# Patient Record
Sex: FEMALE | Race: BLACK OR AFRICAN AMERICAN | Employment: OTHER | URBAN - METROPOLITAN AREA
[De-identification: names, ages, dates, MRNs, and addresses within clinical notes are randomized per-mention and may not be internally consistent; named-entity substitution may affect disease eponyms.]

---

## 2017-01-25 ENCOUNTER — HOSPITAL ENCOUNTER (OUTPATIENT)
Age: 62
Setting detail: OBSERVATION
Discharge: HOME | End: 2017-01-26
Attending: EMERGENCY MEDICINE | Admitting: INTERNAL MEDICINE
Payer: COMMERCIAL

## 2017-01-25 ENCOUNTER — APPOINTMENT (OUTPATIENT)
Dept: GENERAL RADIOLOGY | Age: 62
End: 2017-01-25
Attending: EMERGENCY MEDICINE
Payer: COMMERCIAL

## 2017-01-25 DIAGNOSIS — R77.8 ELEVATED TROPONIN: ICD-10-CM

## 2017-01-25 DIAGNOSIS — R06.02 SOB (SHORTNESS OF BREATH): ICD-10-CM

## 2017-01-25 DIAGNOSIS — R07.9 CHEST PAIN, UNSPECIFIED TYPE: Primary | ICD-10-CM

## 2017-01-25 DIAGNOSIS — R79.89 ELEVATED BRAIN NATRIURETIC PEPTIDE (BNP) LEVEL: ICD-10-CM

## 2017-01-25 PROBLEM — R06.00 DYSPNEA: Status: ACTIVE | Noted: 2017-01-25

## 2017-01-25 PROBLEM — R05.9 COUGH: Status: ACTIVE | Noted: 2017-01-25

## 2017-01-25 PROBLEM — I50.23 SYSTOLIC CHF, ACUTE ON CHRONIC (HCC): Status: ACTIVE | Noted: 2017-01-25

## 2017-01-25 PROBLEM — R11.10 VOMITING: Status: ACTIVE | Noted: 2017-01-25

## 2017-01-25 LAB
ALBUMIN SERPL BCP-MCNC: 3.3 G/DL (ref 3.5–5)
ALBUMIN/GLOB SERPL: 0.9 {RATIO} (ref 1.1–2.2)
ALP SERPL-CCNC: 77 U/L (ref 45–117)
ALT SERPL-CCNC: 112 U/L (ref 12–78)
ANION GAP BLD CALC-SCNC: 9 MMOL/L (ref 5–15)
APPEARANCE UR: CLEAR
AST SERPL W P-5'-P-CCNC: 140 U/L (ref 15–37)
ATRIAL RATE: 94 BPM
BACTERIA URNS QL MICRO: NEGATIVE /HPF
BASOPHILS # BLD AUTO: 0 K/UL (ref 0–0.1)
BASOPHILS # BLD: 0 % (ref 0–1)
BILIRUB SERPL-MCNC: 0.3 MG/DL (ref 0.2–1)
BILIRUB UR QL: NEGATIVE
BNP SERPL-MCNC: 1106 PG/ML (ref 0–125)
BUN SERPL-MCNC: 19 MG/DL (ref 6–20)
BUN/CREAT SERPL: 21 (ref 12–20)
CALCIUM SERPL-MCNC: 8.7 MG/DL (ref 8.5–10.1)
CALCULATED P AXIS, ECG09: 60 DEGREES
CALCULATED R AXIS, ECG10: -6 DEGREES
CALCULATED T AXIS, ECG11: 132 DEGREES
CHLORIDE SERPL-SCNC: 103 MMOL/L (ref 97–108)
CO2 SERPL-SCNC: 30 MMOL/L (ref 21–32)
COLOR UR: ABNORMAL
CREAT SERPL-MCNC: 0.91 MG/DL (ref 0.55–1.02)
DIAGNOSIS, 93000: NORMAL
EOSINOPHIL # BLD: 0.1 K/UL (ref 0–0.4)
EOSINOPHIL NFR BLD: 1 % (ref 0–7)
EPITH CASTS URNS QL MICRO: ABNORMAL /LPF
ERYTHROCYTE [DISTWIDTH] IN BLOOD BY AUTOMATED COUNT: 14 % (ref 11.5–14.5)
EST. AVERAGE GLUCOSE BLD GHB EST-MCNC: 180 MG/DL
GLOBULIN SER CALC-MCNC: 3.7 G/DL (ref 2–4)
GLUCOSE BLD STRIP.AUTO-MCNC: 220 MG/DL (ref 65–100)
GLUCOSE BLD STRIP.AUTO-MCNC: 225 MG/DL (ref 65–100)
GLUCOSE BLD STRIP.AUTO-MCNC: 227 MG/DL (ref 65–100)
GLUCOSE BLD STRIP.AUTO-MCNC: 227 MG/DL (ref 65–100)
GLUCOSE SERPL-MCNC: 296 MG/DL (ref 65–100)
GLUCOSE UR STRIP.AUTO-MCNC: 250 MG/DL
HBA1C MFR BLD: 7.9 % (ref 4.2–6.3)
HCT VFR BLD AUTO: 38.6 % (ref 35–47)
HGB BLD-MCNC: 12.3 G/DL (ref 11.5–16)
HGB UR QL STRIP: NEGATIVE
HYALINE CASTS URNS QL MICRO: ABNORMAL /LPF (ref 0–5)
KETONES UR QL STRIP.AUTO: NEGATIVE MG/DL
LEUKOCYTE ESTERASE UR QL STRIP.AUTO: NEGATIVE
LYMPHOCYTES # BLD AUTO: 23 % (ref 12–49)
LYMPHOCYTES # BLD: 2.4 K/UL (ref 0.8–3.5)
MAGNESIUM SERPL-MCNC: 1.8 MG/DL (ref 1.6–2.4)
MCH RBC QN AUTO: 28 PG (ref 26–34)
MCHC RBC AUTO-ENTMCNC: 31.9 G/DL (ref 30–36.5)
MCV RBC AUTO: 87.9 FL (ref 80–99)
MONOCYTES # BLD: 0.5 K/UL (ref 0–1)
MONOCYTES NFR BLD AUTO: 4 % (ref 5–13)
NEUTS SEG # BLD: 7.5 K/UL (ref 1.8–8)
NEUTS SEG NFR BLD AUTO: 72 % (ref 32–75)
NITRITE UR QL STRIP.AUTO: NEGATIVE
P-R INTERVAL, ECG05: 162 MS
PH UR STRIP: 6.5 [PH] (ref 5–8)
PLATELET # BLD AUTO: 293 K/UL (ref 150–400)
POTASSIUM SERPL-SCNC: 4.2 MMOL/L (ref 3.5–5.1)
PROT SERPL-MCNC: 7 G/DL (ref 6.4–8.2)
PROT UR STRIP-MCNC: NEGATIVE MG/DL
Q-T INTERVAL, ECG07: 372 MS
QRS DURATION, ECG06: 114 MS
QTC CALCULATION (BEZET), ECG08: 465 MS
RBC # BLD AUTO: 4.39 M/UL (ref 3.8–5.2)
RBC #/AREA URNS HPF: ABNORMAL /HPF (ref 0–5)
SERVICE CMNT-IMP: ABNORMAL
SODIUM SERPL-SCNC: 142 MMOL/L (ref 136–145)
SP GR UR REFRACTOMETRY: 1.02 (ref 1–1.03)
TROPONIN I SERPL-MCNC: 0.06 NG/ML
TROPONIN I SERPL-MCNC: 0.06 NG/ML
TROPONIN I SERPL-MCNC: 0.1 NG/ML
TSH SERPL DL<=0.05 MIU/L-ACNC: 2.02 UIU/ML (ref 0.36–3.74)
UROBILINOGEN UR QL STRIP.AUTO: 0.2 EU/DL (ref 0.2–1)
VENTRICULAR RATE, ECG03: 94 BPM
WBC # BLD AUTO: 10.4 K/UL (ref 3.6–11)
WBC URNS QL MICRO: ABNORMAL /HPF (ref 0–4)

## 2017-01-25 PROCEDURE — 99285 EMERGENCY DEPT VISIT HI MDM: CPT

## 2017-01-25 PROCEDURE — 74011250636 HC RX REV CODE- 250/636: Performed by: INTERNAL MEDICINE

## 2017-01-25 PROCEDURE — 85025 COMPLETE CBC W/AUTO DIFF WBC: CPT | Performed by: EMERGENCY MEDICINE

## 2017-01-25 PROCEDURE — 74011250637 HC RX REV CODE- 250/637: Performed by: INTERNAL MEDICINE

## 2017-01-25 PROCEDURE — 74011000250 HC RX REV CODE- 250: Performed by: INTERNAL MEDICINE

## 2017-01-25 PROCEDURE — 74011000250 HC RX REV CODE- 250: Performed by: NURSE PRACTITIONER

## 2017-01-25 PROCEDURE — 83036 HEMOGLOBIN GLYCOSYLATED A1C: CPT | Performed by: INTERNAL MEDICINE

## 2017-01-25 PROCEDURE — 96372 THER/PROPH/DIAG INJ SC/IM: CPT

## 2017-01-25 PROCEDURE — 96376 TX/PRO/DX INJ SAME DRUG ADON: CPT

## 2017-01-25 PROCEDURE — 96375 TX/PRO/DX INJ NEW DRUG ADDON: CPT

## 2017-01-25 PROCEDURE — 36415 COLL VENOUS BLD VENIPUNCTURE: CPT | Performed by: INTERNAL MEDICINE

## 2017-01-25 PROCEDURE — 74011250637 HC RX REV CODE- 250/637: Performed by: EMERGENCY MEDICINE

## 2017-01-25 PROCEDURE — 93005 ELECTROCARDIOGRAM TRACING: CPT

## 2017-01-25 PROCEDURE — 84443 ASSAY THYROID STIM HORMONE: CPT | Performed by: INTERNAL MEDICINE

## 2017-01-25 PROCEDURE — 83735 ASSAY OF MAGNESIUM: CPT | Performed by: EMERGENCY MEDICINE

## 2017-01-25 PROCEDURE — 99218 HC RM OBSERVATION: CPT

## 2017-01-25 PROCEDURE — 77030013140 HC MSK NEB VYRM -A

## 2017-01-25 PROCEDURE — 93306 TTE W/DOPPLER COMPLETE: CPT

## 2017-01-25 PROCEDURE — 74011250636 HC RX REV CODE- 250/636: Performed by: EMERGENCY MEDICINE

## 2017-01-25 PROCEDURE — 94640 AIRWAY INHALATION TREATMENT: CPT

## 2017-01-25 PROCEDURE — 71010 XR CHEST PORT: CPT

## 2017-01-25 PROCEDURE — 80053 COMPREHEN METABOLIC PANEL: CPT | Performed by: EMERGENCY MEDICINE

## 2017-01-25 PROCEDURE — 83880 ASSAY OF NATRIURETIC PEPTIDE: CPT | Performed by: EMERGENCY MEDICINE

## 2017-01-25 PROCEDURE — 87086 URINE CULTURE/COLONY COUNT: CPT | Performed by: INTERNAL MEDICINE

## 2017-01-25 PROCEDURE — 82962 GLUCOSE BLOOD TEST: CPT

## 2017-01-25 PROCEDURE — 74011000250 HC RX REV CODE- 250: Performed by: EMERGENCY MEDICINE

## 2017-01-25 PROCEDURE — 84484 ASSAY OF TROPONIN QUANT: CPT | Performed by: EMERGENCY MEDICINE

## 2017-01-25 PROCEDURE — 81001 URINALYSIS AUTO W/SCOPE: CPT | Performed by: INTERNAL MEDICINE

## 2017-01-25 PROCEDURE — 96365 THER/PROPH/DIAG IV INF INIT: CPT

## 2017-01-25 RX ORDER — MAGNESIUM SULFATE HEPTAHYDRATE 40 MG/ML
2 INJECTION, SOLUTION INTRAVENOUS
Status: COMPLETED | OUTPATIENT
Start: 2017-01-25 | End: 2017-01-25

## 2017-01-25 RX ORDER — SODIUM CHLORIDE 0.9 % (FLUSH) 0.9 %
5-10 SYRINGE (ML) INJECTION EVERY 8 HOURS
Status: DISCONTINUED | OUTPATIENT
Start: 2017-01-25 | End: 2017-01-26 | Stop reason: HOSPADM

## 2017-01-25 RX ORDER — CARVEDILOL 12.5 MG/1
12.5 TABLET ORAL 2 TIMES DAILY WITH MEALS
Status: DISCONTINUED | OUTPATIENT
Start: 2017-01-25 | End: 2017-01-26 | Stop reason: HOSPADM

## 2017-01-25 RX ORDER — ERGOCALCIFEROL 1.25 MG/1
50000 CAPSULE ORAL
COMMUNITY

## 2017-01-25 RX ORDER — BENZONATATE 100 MG/1
100 CAPSULE ORAL
Status: COMPLETED | OUTPATIENT
Start: 2017-01-25 | End: 2017-01-25

## 2017-01-25 RX ORDER — SODIUM CHLORIDE 0.9 % (FLUSH) 0.9 %
5-10 SYRINGE (ML) INJECTION AS NEEDED
Status: DISCONTINUED | OUTPATIENT
Start: 2017-01-25 | End: 2017-01-26 | Stop reason: HOSPADM

## 2017-01-25 RX ORDER — ISOSORBIDE DINITRATE 20 MG/1
20 TABLET ORAL 2 TIMES DAILY
Status: DISCONTINUED | OUTPATIENT
Start: 2017-01-25 | End: 2017-01-26 | Stop reason: HOSPADM

## 2017-01-25 RX ORDER — MAGNESIUM SULFATE 100 %
4 CRYSTALS MISCELLANEOUS AS NEEDED
Status: DISCONTINUED | OUTPATIENT
Start: 2017-01-25 | End: 2017-01-26 | Stop reason: HOSPADM

## 2017-01-25 RX ORDER — IPRATROPIUM BROMIDE AND ALBUTEROL SULFATE 2.5; .5 MG/3ML; MG/3ML
3 SOLUTION RESPIRATORY (INHALATION)
Status: DISCONTINUED | OUTPATIENT
Start: 2017-01-25 | End: 2017-01-26 | Stop reason: HOSPADM

## 2017-01-25 RX ORDER — DEXTROSE 50 % IN WATER (D50W) INTRAVENOUS SYRINGE
12.5-25 AS NEEDED
Status: DISCONTINUED | OUTPATIENT
Start: 2017-01-25 | End: 2017-01-26 | Stop reason: HOSPADM

## 2017-01-25 RX ORDER — BUMETANIDE 0.25 MG/ML
1 INJECTION INTRAMUSCULAR; INTRAVENOUS ONCE
Status: COMPLETED | OUTPATIENT
Start: 2017-01-25 | End: 2017-01-25

## 2017-01-25 RX ORDER — ACETAMINOPHEN 325 MG/1
650 TABLET ORAL
Status: DISCONTINUED | OUTPATIENT
Start: 2017-01-25 | End: 2017-01-26 | Stop reason: HOSPADM

## 2017-01-25 RX ORDER — IPRATROPIUM BROMIDE AND ALBUTEROL SULFATE 2.5; .5 MG/3ML; MG/3ML
3 SOLUTION RESPIRATORY (INHALATION)
Status: COMPLETED | OUTPATIENT
Start: 2017-01-25 | End: 2017-01-25

## 2017-01-25 RX ORDER — HYDRALAZINE HYDROCHLORIDE 25 MG/1
37.5 TABLET, FILM COATED ORAL 2 TIMES DAILY
Status: DISCONTINUED | OUTPATIENT
Start: 2017-01-25 | End: 2017-01-26 | Stop reason: HOSPADM

## 2017-01-25 RX ORDER — DOXYCYCLINE HYCLATE 100 MG
100 TABLET ORAL 2 TIMES DAILY
Status: DISCONTINUED | OUTPATIENT
Start: 2017-01-25 | End: 2017-01-26 | Stop reason: HOSPADM

## 2017-01-25 RX ORDER — GUAIFENESIN 100 MG/5ML
81 LIQUID (ML) ORAL DAILY
Status: DISCONTINUED | OUTPATIENT
Start: 2017-01-25 | End: 2017-01-26 | Stop reason: HOSPADM

## 2017-01-25 RX ORDER — ENOXAPARIN SODIUM 100 MG/ML
40 INJECTION SUBCUTANEOUS EVERY 24 HOURS
Status: DISCONTINUED | OUTPATIENT
Start: 2017-01-25 | End: 2017-01-26 | Stop reason: HOSPADM

## 2017-01-25 RX ORDER — ROSUVASTATIN CALCIUM 10 MG/1
20 TABLET, COATED ORAL DAILY
Status: DISCONTINUED | OUTPATIENT
Start: 2017-01-25 | End: 2017-01-26 | Stop reason: HOSPADM

## 2017-01-25 RX ORDER — PANTOPRAZOLE SODIUM 40 MG/1
40 TABLET, DELAYED RELEASE ORAL
Status: DISCONTINUED | OUTPATIENT
Start: 2017-01-25 | End: 2017-01-25

## 2017-01-25 RX ORDER — IPRATROPIUM BROMIDE AND ALBUTEROL SULFATE 2.5; .5 MG/3ML; MG/3ML
SOLUTION RESPIRATORY (INHALATION)
Status: DISPENSED
Start: 2017-01-25 | End: 2017-01-25

## 2017-01-25 RX ORDER — NALOXONE HYDROCHLORIDE 0.4 MG/ML
0.4 INJECTION, SOLUTION INTRAMUSCULAR; INTRAVENOUS; SUBCUTANEOUS AS NEEDED
Status: DISCONTINUED | OUTPATIENT
Start: 2017-01-25 | End: 2017-01-26 | Stop reason: HOSPADM

## 2017-01-25 RX ORDER — DIPHENHYDRAMINE HCL 25 MG
25 CAPSULE ORAL
Status: DISCONTINUED | OUTPATIENT
Start: 2017-01-25 | End: 2017-01-26 | Stop reason: HOSPADM

## 2017-01-25 RX ORDER — GUAIFENESIN 100 MG/5ML
324 LIQUID (ML) ORAL
Status: COMPLETED | OUTPATIENT
Start: 2017-01-25 | End: 2017-01-25

## 2017-01-25 RX ORDER — ALBUTEROL SULFATE 90 UG/1
2 AEROSOL, METERED RESPIRATORY (INHALATION)
COMMUNITY

## 2017-01-25 RX ORDER — DOXYCYCLINE 100 MG/1
100 TABLET ORAL 2 TIMES DAILY
COMMUNITY
Start: 2017-01-23 | End: 2017-02-05

## 2017-01-25 RX ORDER — FUROSEMIDE 10 MG/ML
40 INJECTION INTRAMUSCULAR; INTRAVENOUS
Status: DISCONTINUED | OUTPATIENT
Start: 2017-01-25 | End: 2017-01-25

## 2017-01-25 RX ORDER — BENZONATATE 100 MG/1
100 CAPSULE ORAL
COMMUNITY
End: 2020-01-01 | Stop reason: ALTCHOICE

## 2017-01-25 RX ORDER — SPIRONOLACTONE 25 MG/1
25 TABLET ORAL DAILY
Status: DISCONTINUED | OUTPATIENT
Start: 2017-01-25 | End: 2017-01-26 | Stop reason: HOSPADM

## 2017-01-25 RX ORDER — IPRATROPIUM BROMIDE AND ALBUTEROL SULFATE 2.5; .5 MG/3ML; MG/3ML
3 SOLUTION RESPIRATORY (INHALATION)
Status: DISCONTINUED | OUTPATIENT
Start: 2017-01-25 | End: 2017-01-25

## 2017-01-25 RX ORDER — MORPHINE SULFATE 2 MG/ML
2 INJECTION, SOLUTION INTRAMUSCULAR; INTRAVENOUS
Status: COMPLETED | OUTPATIENT
Start: 2017-01-25 | End: 2017-01-25

## 2017-01-25 RX ORDER — CARVEDILOL 12.5 MG/1
25 TABLET ORAL 2 TIMES DAILY WITH MEALS
Status: DISCONTINUED | OUTPATIENT
Start: 2017-01-25 | End: 2017-01-25

## 2017-01-25 RX ORDER — METHOTREXATE 2.5 MG/1
20 TABLET ORAL
COMMUNITY
End: 2020-01-01 | Stop reason: ALTCHOICE

## 2017-01-25 RX ORDER — IRBESARTAN 75 MG/1
300 TABLET ORAL
Status: DISCONTINUED | OUTPATIENT
Start: 2017-01-25 | End: 2017-01-25

## 2017-01-25 RX ORDER — HYDROCODONE BITARTRATE AND ACETAMINOPHEN 5; 325 MG/1; MG/1
1 TABLET ORAL
Status: DISCONTINUED | OUTPATIENT
Start: 2017-01-25 | End: 2017-01-25

## 2017-01-25 RX ORDER — ISOSORBIDE MONONITRATE 60 MG/1
60 TABLET, EXTENDED RELEASE ORAL DAILY
Status: DISCONTINUED | OUTPATIENT
Start: 2017-01-25 | End: 2017-01-25

## 2017-01-25 RX ORDER — ONDANSETRON 2 MG/ML
4 INJECTION INTRAMUSCULAR; INTRAVENOUS
Status: DISCONTINUED | OUTPATIENT
Start: 2017-01-25 | End: 2017-01-26 | Stop reason: HOSPADM

## 2017-01-25 RX ORDER — HYDROCODONE BITARTRATE AND ACETAMINOPHEN 5; 325 MG/1; MG/1
1 TABLET ORAL
Status: DISCONTINUED | OUTPATIENT
Start: 2017-01-25 | End: 2017-01-26 | Stop reason: HOSPADM

## 2017-01-25 RX ORDER — RANOLAZINE 500 MG/1
500 TABLET, EXTENDED RELEASE ORAL 2 TIMES DAILY
Status: DISCONTINUED | OUTPATIENT
Start: 2017-01-25 | End: 2017-01-26 | Stop reason: HOSPADM

## 2017-01-25 RX ORDER — METOLAZONE 5 MG/1
5 TABLET ORAL
COMMUNITY

## 2017-01-25 RX ORDER — BUMETANIDE 0.25 MG/ML
1 INJECTION INTRAMUSCULAR; INTRAVENOUS
Status: COMPLETED | OUTPATIENT
Start: 2017-01-25 | End: 2017-01-25

## 2017-01-25 RX ORDER — CARVEDILOL 12.5 MG/1
12.5 TABLET ORAL 2 TIMES DAILY WITH MEALS
COMMUNITY
End: 2020-01-01

## 2017-01-25 RX ORDER — INSULIN LISPRO 100 [IU]/ML
INJECTION, SOLUTION INTRAVENOUS; SUBCUTANEOUS
Status: DISCONTINUED | OUTPATIENT
Start: 2017-01-25 | End: 2017-01-25

## 2017-01-25 RX ORDER — ISOSORBIDE DINITRATE AND HYDRALAZINE HYDROCHLORIDE 37.5; 2 MG/1; MG/1
1 TABLET ORAL 2 TIMES DAILY
COMMUNITY
End: 2020-01-01 | Stop reason: ALTCHOICE

## 2017-01-25 RX ORDER — METHIMAZOLE 5 MG/1
5 TABLET ORAL DAILY
Status: DISCONTINUED | OUTPATIENT
Start: 2017-01-25 | End: 2017-01-25

## 2017-01-25 RX ADMIN — Medication 10 ML: at 21:56

## 2017-01-25 RX ADMIN — ROSUVASTATIN CALCIUM 20 MG: 10 TABLET, FILM COATED ORAL at 21:56

## 2017-01-25 RX ADMIN — ASPIRIN 81 MG CHEWABLE TABLET 324 MG: 81 TABLET CHEWABLE at 05:41

## 2017-01-25 RX ADMIN — BUMETANIDE 1 MG: 0.25 INJECTION, SOLUTION INTRAMUSCULAR; INTRAVENOUS at 03:26

## 2017-01-25 RX ADMIN — ENOXAPARIN SODIUM 40 MG: 40 INJECTION SUBCUTANEOUS at 11:35

## 2017-01-25 RX ADMIN — IPRATROPIUM BROMIDE AND ALBUTEROL SULFATE 3 ML: .5; 2.5 SOLUTION RESPIRATORY (INHALATION) at 04:14

## 2017-01-25 RX ADMIN — HYDROCODONE BITARTRATE AND ACETAMINOPHEN 1 TABLET: 5; 325 TABLET ORAL at 14:45

## 2017-01-25 RX ADMIN — DOXYCYCLINE HYCLATE 100 MG: 100 TABLET, COATED ORAL at 17:49

## 2017-01-25 RX ADMIN — HYDRALAZINE HYDROCHLORIDE 37.5 MG: 25 TABLET, FILM COATED ORAL at 17:49

## 2017-01-25 RX ADMIN — ISOSORBIDE DINITRATE 20 MG: 20 TABLET ORAL at 17:49

## 2017-01-25 RX ADMIN — MAGNESIUM SULFATE HEPTAHYDRATE 2 G: 40 INJECTION, SOLUTION INTRAVENOUS at 02:18

## 2017-01-25 RX ADMIN — RANOLAZINE 500 MG: 500 TABLET, FILM COATED, EXTENDED RELEASE ORAL at 11:35

## 2017-01-25 RX ADMIN — SPIRONOLACTONE 25 MG: 25 TABLET ORAL at 11:35

## 2017-01-25 RX ADMIN — IPRATROPIUM BROMIDE AND ALBUTEROL SULFATE 3 ML: .5; 2.5 SOLUTION RESPIRATORY (INHALATION) at 11:20

## 2017-01-25 RX ADMIN — HYDROCODONE BITARTRATE AND ACETAMINOPHEN 1 TABLET: 5; 325 TABLET ORAL at 23:51

## 2017-01-25 RX ADMIN — RANOLAZINE 500 MG: 500 TABLET, FILM COATED, EXTENDED RELEASE ORAL at 17:49

## 2017-01-25 RX ADMIN — IPRATROPIUM BROMIDE AND ALBUTEROL SULFATE 3 ML: .5; 2.5 SOLUTION RESPIRATORY (INHALATION) at 02:10

## 2017-01-25 RX ADMIN — HYDROCODONE BITARTRATE AND ACETAMINOPHEN 1 TABLET: 5; 325 TABLET ORAL at 19:44

## 2017-01-25 RX ADMIN — ASPIRIN 81 MG CHEWABLE TABLET 81 MG: 81 TABLET CHEWABLE at 11:35

## 2017-01-25 RX ADMIN — Medication 10 ML: at 03:27

## 2017-01-25 RX ADMIN — ISOSORBIDE DINITRATE 20 MG: 20 TABLET ORAL at 11:35

## 2017-01-25 RX ADMIN — HYDRALAZINE HYDROCHLORIDE 37.5 MG: 25 TABLET, FILM COATED ORAL at 11:35

## 2017-01-25 RX ADMIN — CARVEDILOL 12.5 MG: 12.5 TABLET, FILM COATED ORAL at 17:49

## 2017-01-25 RX ADMIN — BENZONATATE 100 MG: 100 CAPSULE ORAL at 04:14

## 2017-01-25 RX ADMIN — METHYLPREDNISOLONE SODIUM SUCCINATE 125 MG: 125 INJECTION, POWDER, FOR SOLUTION INTRAMUSCULAR; INTRAVENOUS at 02:17

## 2017-01-25 RX ADMIN — NITROGLYCERIN 1 INCH: 20 OINTMENT TOPICAL at 05:22

## 2017-01-25 RX ADMIN — Medication 2 MG: at 05:22

## 2017-01-25 RX ADMIN — BUMETANIDE 1 MG: 0.25 INJECTION, SOLUTION INTRAMUSCULAR; INTRAVENOUS at 11:35

## 2017-01-25 RX ADMIN — DOXYCYCLINE HYCLATE 100 MG: 100 TABLET, COATED ORAL at 11:35

## 2017-01-25 NOTE — PROGRESS NOTES
I met with pt as an introductory visit. Pt confirmed her address on demographics as correct. Pt lives with her . She has four adult children. One is a nurse allan Bowen. Pt sees Isaias Carlos with [facility] practice. Pt requested that I contact Ms. Landaverde. I notified her practice that she is hospitalized and they will convey the message to Ms. Landaverde. Pt states she is on oxygen 2.5 liters @ home. She states uses the oxygen only when she needs to do so. In the outpatient setting,pt sees Isaias Carlos as her PCP. She sees Dr April Gregory for her RA. She sees Dr Hilario Crespo with [facility]. She sees Dr Donya Potter ,pulmonologist.  Pt has a nebulizer @ home. She states her nebulizer is 15years old and wants to know if she could have a new nebiulizer. I explained to pt that her insurance may or may not pay for a new nebulizer but would ask physician to write an order for nebulizer . I will continue to follow pt for discharge needs. Josie Wallace RN    Addendum-Order obtained for nebulizer. I will fax order to [employer] and if approved by Марина Oro will obtain a nebulizer for pt from [employer] closet.   Maikel Santiago

## 2017-01-25 NOTE — IP AVS SNAPSHOT
Abdelrahman Shed 
 
 
 [address] [facility] [address] 
[phone] Patient: Pamela Simpson MRN: [id number] FFP:[birth year] You are allergic to the following Allergen Reactions Pcn (Penicillins) Swelling Sulfa (Sulfonamide Antibiotics) Itching Recent Documentation Height Weight Breastfeeding? BMI OB Status Smoking Status 1.651 m 88.3 kg No 32.39 kg/m2 Hysterectomy Never Smoker Unresulted Labs Order Current Status CULTURE, URINE In process Emergency Contacts Name Discharge Info Relation Home Work Mobile Andrew Llanes  Spouse [3] [phone] About your hospitalization You were admitted on:  January 25, 2017 You last received care in the:  [facility] You were discharged on:  January 26, 2017 Unit phone number:  [phone] Why you were hospitalized Your primary diagnosis was:  Chest Pain, Atypical  
 Your diagnoses also included:  Cad (Coronary Artery Disease), Osteoarthritis Of Hip, Dm Type 2 Causing Vascular Disease (Hcc), Dyslipidemia, Htn (Hypertension), Hyperthyroidism, Nonischemic Cardiomyopathy (Hcc), Sleep Apnea, Dyspnea, Cough, Vomiting, Elevated Lfts, Systolic Chf, Acute On Chronic (Hcc) Providers Seen During Your Hospitalizations Provider Role Specialty Primary office phone Manisha Tubbs DO Attending Provider Emergency Medicine [phone] Ana Vivas MD Attending Provider Internal Medicine [phone] Ubaldo Watson MD Attending Provider Internal Medicine [phone] Your Primary Care Physician (PCP) Primary Care Physician Office Phone Office Fax UNKNOWN, PROVIDER ** None ** ** None ** Follow-up Information Follow up With Details Comments Contact Info Ish Chin MD In 1 week  [address] [facility] for You [address] [facility] [address] [phone] Provider Unknown   Patient not available to ask Current Discharge Medication List  
  
START taking these medications Dose & Instructions Dispensing Information Comments Morning Noon Evening Bedtime  
 bumetanide 1 mg tablet Commonly known as:  Hilda Johnson Your next dose is: Today, Tomorrow Other:  _________ Dose:  1 mg Take 1 Tab by mouth daily for 30 days. Quantity:  30 Tab Refills:  0 HYDROcodone-acetaminophen 5-325 mg per tablet Commonly known as:  Macario Odom Your next dose is: Today, Tomorrow Other:  _________ Dose:  1 Tab Take 1 Tab by mouth every four (4) hours as needed for up to 10 doses. Max Daily Amount: 6 Tabs. Quantity:  10 Tab Refills:  0 CONTINUE these medications which have NOT CHANGED Dose & Instructions Dispensing Information Comments Morning Noon Evening Bedtime  
  insulin pump Misc Commonly known as:  PATIENT SUPPLIED Your next dose is: Today, Tomorrow Other:  _________  
   
   
 by SubCUTAneous route as needed. Refills:  0  
     
   
   
   
  
 aspirin 81 mg chewable tablet Your next dose is: Today, Tomorrow Other:  _________ Dose:  81 mg Take 1 Tab by mouth daily. Refills:  0  
     
   
   
   
  
 benzonatate 100 mg capsule Commonly known as:  TESSALON Your next dose is: Today, Tomorrow Other:  _________ Dose:  100 mg Take 100 mg by mouth three (3) times daily as needed for Cough. Refills:  0 BIDIL 20-37.5 mg per tablet Generic drug:  isosorbide-hydrALAZINE Your next dose is: Today, Tomorrow Other:  _________ Dose:  1 Tab Take 1 Tab by mouth two (2) times a day. Refills:  0 COREG 12.5 mg tablet Generic drug:  carvedilol Your next dose is: Today, Tomorrow Other:  _________ Dose:  12.5 mg Take 12.5 mg by mouth two (2) times daily (with meals). Refills:  0  
     
   
   
   
  
 CRESTOR 20 mg tablet Generic drug:  rosuvastatin Your next dose is: Today, Tomorrow Other:  _________ Dose:  20 mg Take 20 mg by mouth nightly. Refills:  0  
     
   
   
   
  
 doxycycline 100 mg tablet Commonly known as:  ADOXA Your next dose is: Today, Tomorrow Other:  _________ Dose:  100 mg Take 100 mg by mouth two (2) times a day. Refills:  0  
     
   
   
   
  
 ergocalciferol 50,000 unit capsule Commonly known as:  ERGOCALCIFEROL Your next dose is: Today, Tomorrow Other:  _________ Dose:  93601 Units Take 50,000 Units by mouth every Tuesday. Refills:  0  
     
   
   
   
  
 FISH OIL 1,000 mg Cap Generic drug:  omega-3 fatty acids-vitamin e Your next dose is: Today, Tomorrow Other:  _________ Dose:  2 Cap Take 2 Caps by mouth two (2) times a week. Refills:  0  
     
   
   
   
  
 insulin aspart 100 unit/mL Inpn Commonly known as:  Clemirella Jose Armando Your next dose is: Today, Tomorrow Other:  _________  
   
   
 by SubCUTAneous route. Use in insulin pump Refills:  0  
     
   
   
   
  
 methotrexate 2.5 mg tablet Commonly known as:  Ray Sides Your next dose is: Today, Tomorrow Other:  _________ Dose:  20 mg Take 20 mg by mouth every Tuesday. Refills:  0  
     
   
   
   
  
 metOLazone 5 mg tablet Commonly known as:  Fabiene Solid Your next dose is: Today, Tomorrow Other:  _________ Dose:  5 mg Take 5 mg by mouth daily as needed (for weight increase of 2 lbs in 24 hrs for Heart Failure). Refills:  0  
     
   
   
   
  
 nitroglycerin 0.4 mg SL tablet Commonly known as:  NITROSTAT Your next dose is: Today, Tomorrow Other:  _________  Dose:  0.4 mg  
 1 Tab by SubLINGual route every five (5) minutes as needed for Chest Pain. Quantity:  1 Bottle Refills:  prn  
     
   
   
   
  
 ORENCIA SC Your next dose is: Today, Tomorrow Other:  _________  
   
   
 by SubCUTAneous route every four (4) weeks. Refills:  0 PROAIR HFA 90 mcg/actuation inhaler Generic drug:  albuterol Your next dose is: Today, Tomorrow Other:  _________ Dose:  2 Puff Take 2 Puffs by inhalation every six (6) hours as needed for Wheezing. Refills:  0  
     
   
   
   
  
 ranolazine  mg SR tablet Commonly known as:  RANEXA Your next dose is: Today, Tomorrow Other:  _________ Dose:  500 mg Take 1 Tab by mouth two (2) times a day. Quantity:  60 Tab Refills:  3  
     
   
   
   
  
 spironolactone 25 mg tablet Commonly known as:  ALDACTONE Your next dose is: Today, Tomorrow Other:  _________ Dose:  25 mg Take 25 mg by mouth daily. Refills:  0 Where to Get Your Medications Information on where to get these meds will be given to you by the nurse or doctor. ! Ask your nurse or doctor about these medications  
  bumetanide 1 mg tablet HYDROcodone-acetaminophen 5-325 mg per tablet Discharge Instructions HOSPITALIST DISCHARGE INSTRUCTIONS 
 
NAME: Keyla Santos :  1955 MRN:  [id number] Date:     2017 ADMIT DATE: 2017 DISCHARGE DATE: 2017 PRINCIPAL ADMITTING DIAGNOSIS: 
chest pain DISCHARGE DIAGNOSES: 
Principal Problem: 
  Chest pain, atypical (2017) CAD (coronary artery disease) (10/31/2011) HTN (hypertension) (10/31/2011) Nonischemic cardiomyopathy ([facility] [address] (2010) Dyslipidemia (2011) DM type [facility] causing vascular disease [facility] Sleep apnea Hyperthyroidism (2011) Osteoarthritis of hip Dyspnea (1/25/2017) Elevated LFTs (1/25/2017) Systolic CHF, acute on chronic ([facility] (1/25/2017) MEDICATIONS: 
 
· It is important that medications are taken exactly as they are prescribed on the discharge medication instructions and keep them your  in the bottles provided by the pharmacist.  
· Keep a list of the medication names, dosages, and times to be taken at all times. · Do not take other medications without consulting your doctor. Recommended diet:  Cardiac, low salt  Diet Recommended activity: Activity as tolerated Post discharge care: 
 
Notify follow up health care provider or return to the emergency department if you cannot get hold of your doctor if you feel worse or experience symptoms similar to those that brought you to hospital 
 
Follow-up Information Follow up With Details Comments Contact Paola Pearl MD In 1 week  [address] [facility] for You [id number] [place] [address] 
[phone] Provider Unknown   Patient not available to ask Information obtained by : 
I understand that if any problems occur once I am at home I am to contact my physician and I understand and acknowledge receipt of the instructions indicated above. Physician's or R.N.'s Signature                                                                  Date/Time Patient or Representative Signature                                                          Date/Time Discharge Orders None [facility] Announcement We are excited to announce that we are making your provider's discharge notes available to you in [employer].   You will see these notes when they are completed and signed by the physician that discharged you from your recent hospital stay. If you have any questions or concerns about any information you see in [email or web address], please call the Health Information Department where you were seen or reach out to your Primary Care Provider for more information about your plan of care. Introducing [facility]! Dear Tatiana Chinchilla: 
Thank you for requesting a [email or web address] account. Our records indicate that you already have an active [email or web address] account. You can access your account anytime at [email or web address] Did you know that you can access your hospital and ER discharge instructions at any time in [email or web address]? You can also review all of your test results from your hospital stay or ER visit. Additional Information If you have questions, please visit the Frequently Asked Questions section of the [email or web address] website at [email or web address] Remember, [email or web address] is NOT to be used for urgent needs. For medical emergencies, dial 911. Now available from your iPhone and Android! General Information Please provide this summary of care documentation to your next provider. Patient Signature:  ____________________________________________________________ Date:  ____________________________________________________________  
  
[facility] Provider Signature:  ____________________________________________________________ Date:  ____________________________________________________________

## 2017-01-25 NOTE — ED TRIAGE NOTES
Pt arrived via EMS with c/o sob  that began tonight at 8:40pm.  has recently been treated for bronchitis, but yesterday her physician said they saw a \"spot\" on her chest xray.  has had a productive cough recently.

## 2017-01-25 NOTE — H&P
[address] [facility]  [address] [facility] [address]  [phone]    Hospitalist Admission Note      NAME:  Nitza Rosenberg   :   1955   MRN:  [id number]     PCP:  Not On File [facility]     Date/Time:  2017 5:29 AM          Subjective:     CHIEF COMPLAINT: dyspnea     HISTORY OF PRESENT ILLNESS:     Ms. Bal Carlos is a 63 y.o.  female who presented to the Emergency Department complaining of dyspnea. It has worsened for a week. Failed to improve to outpatient steroids and antibiotics (azithro and levaquin). Associated with dry cough and vomiting, now with new chest pain. Patient with hx of non-ischemic CHF and small vessel disease in her heart. ER workup was unremarkable, beyond some tachypnea and mild pulmonary edema. We will admit her for observation. Past Medical History   Diagnosis Date    Angina pectoris with normal coronary arteriogram ([facility]     CAD (coronary artery disease)      \"small vessel\" disease by cath at [facility] 2010 ([place])    DJD (degenerative joint disease) of hip      Dr Ana Lilia Mcguire - recent steroid injection    DM (diabetes mellitus) ([address]     Hypertension     Hyperthyroidism     Nonischemic cardiomyopathy ([facility]     SOFÍA (obstructive sleep apnea)     Systolic heart failure secondary to idiopathic cardiomyopathy [facility])         Past Surgical History   Procedure Laterality Date    Hx gyn       Hysterectomy    Stress test lexiscan  12/2/10     no ischemia. EF 56%     Echo 2d adult  05     mild LVH. EF 55%    Cardiac catheterization  12/9/10     LVEDP 21 EF 55%, no epicardial disease. small vessel disease     Cardiac catheterization  10/31/11      LVEDP 23, mildly dilated LV with LVH, EF 35%, global HK, normal cors.     Echo 2d adult  12     EF 35-40%, septal dysynchrony       Social History   Substance Use Topics    Smoking status: Never Smoker    Smokeless tobacco: Not on file    Alcohol use Yes      Comment: rarely        Family History   Problem Relation Age of Onset    Heart Surgery Father     Heart Attack Father         Allergies   Allergen Reactions    Pcn [Penicillins] Swelling    Sulfa (Sulfonamide Antibiotics) Itching        Prior to Admission medications    Medication Sig Start Date End Date Taking? Authorizing Provider   Cholecalciferol, Vitamin D3, (VITAMIN D) 1,000 unit Cap Take  by mouth. Historical Provider   irbesartan (AVAPRO) 300 mg tablet Take 300 mg by mouth nightly. Historical Provider   rosuvastatin (CRESTOR) 20 mg tablet Take 20 mg by mouth daily. Historical Provider   ranolazine ER (RANEXA) 500 mg SR tablet Take 1 Tab by mouth two (2) times a day. 11/8/11   Ritu Gallon, ACNP   pantoprazole (PROTONIX) 40 mg tablet Take 1 Tab by mouth Daily (before breakfast). Future refills by Dr. Royal Willson 11/3/11   Ritu Gallon, ACNP   aspirin 81 mg chewable tablet Take 1 Tab by mouth daily. 11/1/11   Ritu Gallon, ACNP   carvedilol (COREG) 25 mg tablet Take 1 Tab by mouth two (2) times daily (with meals). 11/1/11   Ritu Gallon, ACNP   isosorbide mononitrate ER (IMDUR) 60 mg CR tablet Take 1 Tab by mouth daily. 11/1/11   Ritu Gallon, ACNP   nitroglycerin (NITROSTAT) 0.4 mg SL tablet 1 Tab by SubLINGual route every five (5) minutes as needed for Chest Pain. 11/1/11   Ritu Gallon, ACNP   omega-3 fatty acids-vitamin e (FISH OIL) 1,000 mg Cap Take 4 Caps by mouth nightly. 11/1/11   Ritu Gallon, ACNP   spironolactone (ALDACTONE) 25 mg tablet Take 25 mg by mouth daily. Kong Gar MD   methimazole (TAPAZOLE) 10 mg tablet Take 5 mg by mouth daily. Kong Gar MD   insulin aspart (NOVOLOG) 100 unit/mL flexpen by SubCUTAneous route.     Kong Gar MD       Review of Systems:  (bold if positive, if negative)    Gen:  Eyes:  ENT:  CVS:  chest pain,Pulm:  Cough, dyspnea, sputumwheezingGI:    :    MS:  arthritisSkin:  Psych:  Endo:    Hem:  Renal:    Neuro:        Objective:      VITALS:    Vital signs reviewed; most recent are:    Visit Vitals    /83    Pulse 74    Temp 98.9 °F (37.2 °C)    Resp 20    Ht 5' 5\" (1.651 m)    Wt 86.2 kg (190 lb)    SpO2 96%    BMI 31.62 kg/m2     SpO2 Readings from Last 6 Encounters:   01/25/17 96%   05/26/15 95%   02/06/12 98%   11/03/11 98%        No intake or output data in the 24 hours ending 01/25/17 0529     Exam:     Physical Exam:    Gen:  Obese, in no acute distress  HEENT:  Pink conjunctivae, PERRL, hearing intact to voice, moist mucous membranes  Neck:  Supple, without masses, thyroid non-tender  Resp:  Mild accessory muscle use, bilateral breath sounds without wheezes rales or rhonchi  Card:  No murmurs, normal S1, S2 without thrills, bruits or peripheral edema  Abd:  Soft, non-tender, non-distended, normoactive bowel sounds are present, no mass  Lymph:  No cervical or inguinal adenopathy  Musc:  No cyanosis or clubbing  Skin:  No rashes or ulcers, skin turgor is good  Neuro:  Cranial nerves are grossly intact, no focal motor weakness, follows commands appropriately  Psych:  Good insight, oriented to person, place and time, alert     Labs:    Recent Labs      01/25/17   0208   WBC  10.4   HGB  12.3   HCT  38.6   PLT  293     Recent Labs      01/25/17   0208   NA  142   K  4.2   CL  103   CO2  30   GLU  296*   BUN  19   CREA  0.91   CA  8.7   MG  1.8   ALB  3.3*   TBILI  0.3   SGOT  140*   ALT  112*     Lab Results   Component Value Date/Time    Glucose (POC) 225 01/25/2017 04:18 AM    Glucose (POC) 97 11/03/2011 11:48 AM    Glucose (POC) 309 10/31/2011 03:23 AM     No results for input(s): PH, PCO2, PO2, HCO3, FIO2 in the last 72 hours. No results for input(s): INR in the last 72 hours. No lab exists for component: INREXT  All Micro Results     None          I have reviewed previous records       Assessment and Plan:      Chest pain - POA, unclear etiology. DDx dyspnea, CAD, pleuritic, GERD. Check ECHO. Consult cards. Trend troponin.   So far troponin max of 0.10 suggest strain, not AMI. Continue ASA, PPI, ranolazine, IMDUR. She mentions negative prior reflux study. CAD (coronary artery disease) / HTN (hypertension) - Per chart she has \"small vessel\" disease by cath Nov 2010 (at [address] [facility] [address] [facility] [address]). For now continue irbesartan, statin, ranolazine, IMDUR, coreg, ASA, spironolactone    Systolic heart failure secondary to idiopathic cardiomyopathy / Nonischemic cardiomyopathy / pulmonary edema / Dyspnea / Cough - 2011 EF 35%, global HK. Check ECHO. Hold fluids. May begin diuresis if cards agrees, at least continue spironolactone. She mentions negative cardiac muscle bx and unchanged cath at [address] [facility] about 5 months ago with Dr Dejon Donovan. She is not hypoxic, and no SIRS criteria. No Abx needed. DM type 2 causing vascular disease - Diabetic diet and counseling. SSI per protocol. Not on other home meds. Check A1c. Sleep apnea - CPAP if she has it. Oxygen if needed. Dyslipidemia - continue rosuvastatin. Hyperthyroidism - Continue tapazole and check TSH    Osteoarthritis of hip - Tylenol prn    Vomiting - Secondary to cough, most likely. Supportive care. Elevated LFTs - Mild. Suspect mild vascular congestion, and vomiting. Monitor.     GERD - Continue PPI     Telemetry reviewed:   normal sinus rhythm    Risk of deterioration: high      Total time spent with patient: 79 [address] [facility] discussed with: Patient, Nursing Staff and >50% of time spent in counseling and coordination of care    Discussed:  Care Plan       ___________________________________________________    Attending Physician: Jennifer Antonio MD

## 2017-01-25 NOTE — DIABETES MGMT
DTC Pump and Consult Note    Recommendations/ Comments:     Pt will require the followin. DC Humalog coverage (Pt will bolus through her insulin pump)- please do not DC POCT Glucose - we still need her blood sugars on record  2. Please order 1 vial of Humalog U-100. Ms. Von Heck will have to get the insulin for her insulin pump set change today. Consult received for:  [x]          Assessment of home management     []          Meter/monitoring     []          Insulin instruction     []          New diagnosis     []          Outpatient education     [x]          Insulin pump patient     []          Insulin infusion     []          DKA/HHS    Chart reviewed and initial evaluation complete on [facility]. Patient is a 63 y.o. female with a past medical history of DM - pump user x 5 years. Insulin Pump Settings    Pt on [employer] Minimed (Type of Pump) and uses Hugh (5mm) (Type of infusion set). Date of last site change Monday - next set change is due today. Pt states her  will be bringing supplies ~ 4 pm.     Basal Rates     Insulin Carb Ratios  0000 to 1259 = 2.200 units/hr   0000 to 0100 = 1 unit per 10 g   1300 to 2359 = 2.4500 units/hr   0100 to 2359 = 1 unit per 19 g   Total 24 hour basal dose = 55.550   Insulin Sensitivity Factors  1 unit per 30 mg/dl     Target BG  100 - 110 mg/dl      BG monitoring at home 3-6 times per day. Patient reports BG levels at home - has a sensor. {If no to any of the following the pump should be d/c per hospital policy:  · Patient able to program doctors ordered settings,   yes [x]            No   []                · Patient able to provide pump materials  yes [x]              No   []            · Able to change infusion set and fill a new syringe at least for 3 days ( 2 days if pregnant.   yes [x]              No   []            · Patient will change setting if redness, swelling, blood backing up, pump alerts, \"No Delivery\" alarm, or two or more glucose reading         in a row greater than 250mg/dl    yes [x]              No   []           · Patient able to repeat basal rates [x]              No   []            · Patient able to bolus corrections and meals based on physician orders. yes [x]         No   []              · Assessed and instructed patient on the following: . ·  interpretation of lab results, blood sugar goals, use of sliding scale/correction formula and steroid use in hospital    Encouraged the following: communicate with nurse during hospital stay about boluses. Provided patient with the following: [x]          Survival skills education materials               []          Insulin education materials               []          CHO counting education materials               []          Outpatient DTC contact number               []          Glucometer               []          Insulin start kit- vial/syringe               []          Insulin start kit- pen    A1c:   No results found for: HBA1C, HGBE8, [id number]    Recent Glucose Results:   Lab Results   Component Value Date/Time     (H) 01/25/2017 02:08 AM    GLUCPOC 225 (H) 01/25/2017 04:18 AM        Lab Results   Component Value Date/Time    Creatinine 0.91 01/25/2017 02:08 AM       Active Orders   Diet    DIET CARDIAC Regular; 2 GM NA (House Low NA); Consistent Carb 1800kcal        PO intake: No data found. Current hospital DM medication: insulin pump     Will continue to follow as needed. Thank you. Supa Crouch.  CASIE Mccartney, [address] [place] [address]   [phone] (office)  [phone] (pager)

## 2017-01-25 NOTE — PROGRESS NOTES
i received notification from [employer] that the nebulizer will be 140 dollars out of pocket. Pt can purchase a nebulizer @ a pharmacy for less. I gave pt a copy of the order to take with her to a pharmacy,and returned the nebulizer to the [facility] closet. [facility] will bring paperwork to go with the nebulizer. Ashanti Fleming.

## 2017-01-25 NOTE — CONSULTS
Jared Stanton MD [facility] [address]  Office [phone]      Date of  Admission: 1/25/2017  1:47 AM       Assessment/Plan:   · Chest pain with minimal troponin elevation: cont to trend troponins. ECHO complete. She has an element of chest wall pain - MSK/inflammation likely due to excessive coughing from bronchitis. Would try Norco prn for pain. · NICM: Coreg , ARB. Low na diet. Will get records from Dr Fouzia Broderick and Dr Wong Knapp ([facility]). Recent cath Sept 2016. Prior Cath in 2010 - Non obstructive dz. · A/C systolic CHF: likely exacerbated by URI. Dose diuretics again today. · CAD: cont ranexa, imdur. · Type 2 DM:   · SOFÍA:         Pt personally seen and examined. Chart reviewed. Agree with advanced NP's history, exam and  A/P with changes/additons      Thank you for allowing us to participate in Chris Badillo care. We will be happy to follow along. Please call for any questions. Shiv Hunter MD, [facility]          Consult requested by Quentin Roy MD for *chest pain    Subjective:  Chris Badillo is a 63 y.o.  [place]  female  with PMH significant for NICM, CAD, HTN, Type 2 DM, SOFÍA wears oxygen nightly, dyslipidemia who presented to the Emergency Department last night complaining of dyspnea. It has worsened over the past week. Failed to improve to outpatient steroids and antibiotics (azithro and levaquin). Last night she woke up with chest pain , excessive coughing and  wheezing, sat down for a bit then took a shower the pain radiated through her chest to her back and shoulders with audible wheezing. Pain in worse with palpation and deep inspiration this am. NTP has not changed the pain  In ED chest xray showed Mild interstitial edema and cardiomegaly. She has been treated with bumex 1 mg IV. She follows with Dr Fouzia Broderick at [facility] and Dr Wong Knapp at [facility]. Most recent visit was Dec 2016. Cath in Sept 2016. with Dr Wong Knapp.  Noted weight gain 2 # earlier this week. She is aware her EF is low but not sure of exact number.             The patient denies chest pain, dependent edema, diaphoresis, ROSADO, orthopnea, palpitations, PND, shortness of breath, claudication or syncope. No bleeding. LABS:   Troponin:  .06, .10      CXR: Mild interstitial edema and cardiomegaly. pBNP 1106      Cardiographics:   Telemetry:  SR PVC's  ECG: NSR LVH     Cardiac Testing/ Procedures: A. Cardiac Cath/PCI: R & L heart cath 9/2016  B. ECHO/DONNIE:   C.StressNuclear/Stress ECHO/Stress test:   D.Vascular:   E. EP:   F. Miscellaneous  SOCIAL HX:  FAMILY HX:      Patient Active Problem List    Diagnosis Date Noted    Chest pain 01/25/2017    Dyspnea 01/25/2017    Cough 01/25/2017    Vomiting 01/25/2017    Elevated LFTs 08/67/2053    Systolic heart failure secondary to idiopathic cardiomyopathy ([address]     Osteoarthritis of hip     Dyslipidemia 11/01/2011    Hyperthyroidism 11/01/2011    DM type 2 causing vascular disease ([address]     Sleep apnea     CAD (coronary artery disease) 10/31/2011    HTN (hypertension) 10/31/2011    Nonischemic cardiomyopathy ([address] 11/01/2010      Past Medical History   Diagnosis Date    Angina pectoris with normal coronary arteriogram ([address]     CAD (coronary artery disease)      \"small vessel\" disease by cath at [address] Nov 2010 (Luisa Montes)    DJD (degenerative joint disease) of hip      Dr Jazmin Granados - recent steroid injection    DM (diabetes mellitus) ([address]     Hypertension     Hyperthyroidism     Nonischemic cardiomyopathy ([address]     SOFÍA (obstructive sleep apnea)     Systolic heart failure secondary to idiopathic cardiomyopathy [facility])       Past Surgical History   Procedure Laterality Date    Hx gyn       Hysterectomy    Stress test lexiscan  12/2/10     no ischemia. EF 56%     Echo 2d adult  4/7/05     mild LVH. EF 55%    Cardiac catheterization  12/9/10     LVEDP 21 EF 55%, no epicardial disease.  small vessel disease  Cardiac catheterization  10/31/11      LVEDP 23, mildly dilated LV with LVH, EF 35%, global HK, normal cors.     Echo 2d adult  1/27/12     EF 35-40%, septal dysynchrony     Allergies   Allergen Reactions    Pcn [Penicillins] Swelling    Sulfa (Sulfonamide Antibiotics) Itching      Current Facility-Administered Medications   Medication Dose Route Frequency    sodium chloride (NS) flush 5-10 mL  5-10 mL IntraVENous Q8H    sodium chloride (NS) flush 5-10 mL  5-10 mL IntraVENous PRN    aspirin chewable tablet 81 mg  81 mg Oral DAILY    carvedilol (COREG) tablet 25 mg  25 mg Oral BID WITH MEALS    irbesartan (AVAPRO) tablet 300 mg  300 mg Oral QHS    isosorbide mononitrate ER (IMDUR) tablet 60 mg  60 mg Oral DAILY    methIMAzole (TAPAZOLE) tablet 5 mg  5 mg Oral DAILY    pantoprazole (PROTONIX) tablet 40 mg  40 mg Oral ACB    ranolazine ER (RANEXA) tablet 500 mg  500 mg Oral BID    rosuvastatin (CRESTOR) tablet 20 mg  20 mg Oral DAILY    spironolactone (ALDACTONE) tablet 25 mg  25 mg Oral DAILY    sodium chloride (NS) flush 5-10 mL  5-10 mL IntraVENous Q8H    sodium chloride (NS) flush 5-10 mL  5-10 mL IntraVENous PRN    naloxone (NARCAN) injection 0.4 mg  0.4 mg IntraVENous PRN    glucose chewable tablet 16 g  4 Tab Oral PRN    dextrose (D50W) injection syrg 12.5-25 g  12.5-25 g IntraVENous PRN    glucagon (GLUCAGEN) injection 1 mg  1 mg IntraMUSCular PRN    acetaminophen (TYLENOL) tablet 650 mg  650 mg Oral Q4H PRN    HYDROcodone-acetaminophen (NORCO) 5-325 mg per tablet 1 Tab  1 Tab Oral Q4H PRN    diphenhydrAMINE (BENADRYL) capsule 25 mg  25 mg Oral Q4H PRN    ondansetron (ZOFRAN) injection 4 mg  4 mg IntraVENous Q4H PRN    enoxaparin (LOVENOX) injection 40 mg  40 mg SubCUTAneous Q24H    insulin lispro (HUMALOG) injection   SubCUTAneous AC&HS          Review of Symptoms:  Constitutional: positive for fatigue  ENT: negative   Respiratory: positive for cough, sputum, wheezing or dyspnea on exertion  Gastrointestinal: negative for reflux symptoms  Genitourinary: No dysuria, hematuria, frequency   Musculoskeletal:positive for back pain  Neurological: negative for memory problems  Other systems reviewed and negative except as above. Social History     Social History    Marital status:      Spouse name: N/A    Number of children: N/A    Years of education: N/A     Social History Main Topics    Smoking status: Never Smoker    Smokeless tobacco: None    Alcohol use Yes      Comment: rarely    Drug use: No    Sexual activity: Not Asked     Other Topics Concern    None     Social History Narrative     Family History   Problem Relation Age of Onset    Heart Surgery Father     Heart Attack Father          Physical Exam    Visit Vitals    /78    Pulse 76    Temp 97.7 °F (36.5 °C)    Resp 14    Ht 5' 5\" (1.651 m)    Wt 190 lb (86.2 kg)    SpO2 96%    Breastfeeding No    BMI 31.62 kg/m2     General: awake, alert, and oriented. MAEx4. No acute distress   HEENT Exam:     Normocephalic, atraumatic. Sclera anicteric . Neck: supple, no lymphadenopathy  Lung Exam:     Not dyspneic at rest. Respirations unlabored. O2 @ 96% room air Sat: . Lungs: No wheezes, crackles, rhonchi, or rubs heard on auscultation. Heart Exam:       Unable to palpate PMI due to body habitus Rate: Rhythm: regular,   No  S3, S4 gallop, murmur, click, or rub. Chest wall tender over sternum with palpation. No JVD,  no carotid bruits. Abdomen Exam:      obese, soft, nontender. + Bowel sounds. No palpable masses; organomegaly. No bruits or pulsatile mass. : voiding     Extremities Exam:      Atraumatic.  No clubbing, cyanosis, edema, ulcers,    Vascular Exam:      radial, brachial, dorsalis pedis, posterior tibial, are equal and strong bilaterally     Neuro exam:  No focal deficits   Psy Exam: Normal affect, does not appear anxious or agitated        Recent Results (from the past 12 hour(s))   CBC WITH AUTOMATED DIFF    Collection Time: 01/25/17  2:08 AM   Result Value Ref Range    WBC 10.4 3.6 - 11.0 K/uL    RBC 4.39 3.80 - 5.20 M/uL    HGB 12.3 11.5 - 16.0 g/dL    HCT 38.6 35.0 - 47.0 %    MCV 87.9 80.0 - 99.0 FL    MCH 28.0 26.0 - 34.0 PG    MCHC 31.9 30.0 - 36.5 g/dL    RDW 14.0 11.5 - 14.5 %    PLATELET 125 864 - 609 K/uL    NEUTROPHILS 72 32 - 75 %    LYMPHOCYTES 23 12 - 49 %    MONOCYTES 4 (L) 5 - 13 %    EOSINOPHILS 1 0 - 7 %    BASOPHILS 0 0 - 1 %    ABS. NEUTROPHILS 7.5 1.8 - 8.0 K/UL    ABS. LYMPHOCYTES 2.4 0.8 - 3.5 K/UL    ABS. MONOCYTES 0.5 0.0 - 1.0 K/UL    ABS. EOSINOPHILS 0.1 0.0 - 0.4 K/UL    ABS. BASOPHILS 0.0 0.0 - 0.1 K/UL   METABOLIC PANEL, COMPREHENSIVE    Collection Time: 01/25/17  2:08 AM   Result Value Ref Range    Sodium 142 136 - 145 mmol/L    Potassium 4.2 3.5 - 5.1 mmol/L    Chloride 103 97 - 108 mmol/L    CO2 30 21 - 32 mmol/L    Anion gap 9 5 - 15 mmol/L    Glucose 296 (H) 65 - 100 mg/dL    BUN 19 6 - 20 MG/DL    Creatinine 0.91 0.55 - 1.02 MG/DL    BUN/Creatinine ratio 21 (H) 12 - 20      GFR est AA >60 >60 ml/min/1.73m2    GFR est non-AA >60 >60 ml/min/1.73m2    Calcium 8.7 8.5 - 10.1 MG/DL    Bilirubin, total 0.3 0.2 - 1.0 MG/DL     (H) 12 - 78 U/L     (H) 15 - 37 U/L    Alk.  phosphatase 77 45 - 117 U/L    Protein, total 7.0 6.4 - 8.2 g/dL    Albumin 3.3 (L) 3.5 - 5.0 g/dL    Globulin 3.7 2.0 - 4.0 g/dL    A-G Ratio 0.9 (L) 1.1 - 2.2     TROPONIN I    Collection Time: 01/25/17  2:08 AM   Result Value Ref Range    Troponin-I, Qt. 0.06 (H) <0.05 ng/mL   MAGNESIUM    Collection Time: 01/25/17  2:08 AM   Result Value Ref Range    Magnesium 1.8 1.6 - 2.4 mg/dL   PRO-BNP    Collection Time: 01/25/17  2:18 AM   Result Value Ref Range    NT pro-BNP 1106 (H) 0 - 125 PG/ML   TROPONIN I    Collection Time: 01/25/17  4:16 AM   Result Value Ref Range    Troponin-I, Qt. 0.10 (H) <0.05 ng/mL   TSH 3RD GENERATION    Collection Time: 01/25/17  4:16 AM   Result Value Ref Range TSH 2.02 0.36 - 3.74 uIU/mL   GLUCOSE, POC    Collection Time: 01/25/17  4:18 AM   Result Value Ref Range    Glucose (POC) 225 (H) 65 - 100 mg/dL    Performed by [address] [place] [facility]

## 2017-01-25 NOTE — ED NOTES
TRANSFER - OUT REPORT:    Verbal report given to [facility], RN(name) on Donaldo Nguyen  being transferred to [address] for routine progression of care       Report consisted of patients Situation, Background, Assessment and   Recommendations(SBAR). Information from the following report(s) SBAR, Kardex, ED Summary, Procedure Summary, Intake/Output, MAR, Recent Results and Cardiac Rhythm NSR was reviewed with the receiving nurse. Lines:   Peripheral IV 01/25/17 Right Antecubital (Active)   Site Assessment Clean, dry, & intact 1/25/2017  2:16 AM   Phlebitis Assessment 0 1/25/2017  2:16 AM   Infiltration Assessment 0 1/25/2017  2:16 AM   Dressing Status Clean, dry, & intact 1/25/2017  2:16 AM   Dressing Type Transparent 1/25/2017  2:16 AM   Hub Color/Line Status Pink 1/25/2017  2:16 AM   Alcohol Cap Used Yes 1/25/2017  2:16 AM        Opportunity for questions and clarification was provided.       Patient transported with:   Monitor  Tech

## 2017-01-25 NOTE — PROGRESS NOTES
TRANSFER - IN REPORT:    Verbal report received from [facility]) on Pat Chow  being received from ED(unit) for routine progression of care      Report consisted of patients Situation, Background, Assessment and   Recommendations(SBAR). Information from the following report(s) SBAR, Kardex, ED Summary, Intake/Output, MAR, Recent Results and Cardiac Rhythm NSR was reviewed with the receiving nurse. Opportunity for questions and clarification was provided. Assessment completed upon patients arrival to unit and care assumed. 1900: SBAR report given to night shift nurse. All labs and plan of care reviewed.

## 2017-01-25 NOTE — PROGRESS NOTES
[facility]: MED RECONCILIATION    Comments/Recommendations:   Patient receives Orencia every 4 weeks at [address] [facility]. Patient states she is due for it today. Patient missed her weekly Ergocaliferol and Methotrexate doses yesterday. Medication(s) ADDED to PTA list:  1. Methotrexate 20 mg every Tues  2. Insulin pump (w/ novolog refill)  3. Bidil 1 tab BID  4. Proair prn  5. Metolazone 5 mg as needed for weight gain  6. Benzonatate 100 mg TID prn  7. Orencia every 4 weeks  8. Doxycycline 100 mg BID x 14 days (started on Mon 1/23/17)    Medication(s) REMOVED from PTA list:  1. Irbesartan 300 mg nightly  2. Imdur ER 60 mg daily  3. Methimazole 5 mg daily  4. Pantoprazole 40 mg daily    Medication(s) ADJUSTED on PTA list:  1. Coreg dose corrected to 12.5 mg BID  2. Vit D corrected to 50,000 units every Tues  3. Fish oil updated to 2 tabs at [facility] twice weekly      Information obtained from: Patient/ Rx bottles/ Rx Query    Significant PMH/Disease States:   Past Medical History   Diagnosis Date    [facility]ina [facility]is [facility]     CAD (coronary artery disease)      \"small vessel\" disease by cath at [address] [facility] Nov 2010 ([place])    DJD (degenerative joint disease) of hip      Dr Rosalio Zaragoza - recent steroid injection    DM (diabetes mellitus) ([facility]     Hypertension     Hyperthyroidism     Nonischemic cardiomyopathy ([facility]     SOFÍA (obstructive sleep apnea)     Systolic heart failure secondary to idiopathic cardiomyopathy [facility])        Chief Complaint for this Admission:   Chief Complaint   Patient presents with    Shortness of Breath         Allergies: Pcn [penicillins] and Sulfa (sulfonamide antibiotics)    Prior to Admission Medications:     Medication Documentation Review Audit       Reviewed by Edelmira Carlton PHARMD (Pharmacist) on 01/25/17 at [address] [place] [address] Place Provider Last Dose Status Taking?       insulin pump (PATIENT SUPPLIED) misc by SubCUTAneous route as needed.  Historical Provider  Active Yes    ABATACEPT (ORENCIA SC) by SubCUTAneous route every four (4) weeks. Historical Provider 12/28/2016 Active Yes    albuterol (PROAIR HFA) 90 mcg/actuation inhaler Take 2 Puffs by inhalation every six (6) hours as needed for Wheezing. Historical Provider  Active Yes    aspirin 81 mg chewable tablet Take 1 Tab by mouth daily. CONNER Alberts Mc 1/24/2017 Unknown time Active Yes    benzonatate (TESSALON) 100 mg capsule Take 100 mg by mouth three (3) times daily as needed for Cough. Historical Provider  Active Yes    carvedilol (COREG) 12.5 mg tablet Take 12.5 mg by mouth two (2) times daily (with meals). Historical Provider 1/24/2017 am Active Yes    doxycycline (ADOXA) 100 mg tablet Take 100 mg by mouth two (2) times a day. Historical Provider 1/24/2017 am Active Yes    ergocalciferol (ERGOCALCIFEROL) 50,000 unit capsule Take 50,000 Units by mouth every Tuesday. Historical Provider 1/17/2017 Unknown time Active Yes    insulin aspart (NOVOLOG) 100 unit/mL flexpen by SubCUTAneous route. Use in insulin pump Phys MD Rin  Active No    isosorbide-hydrALAZINE (BIDIL) 20-37.5 mg per tablet Take 1 Tab by mouth two (2) times a day. Historical Provider 1/24/2017 am Active Yes    methotrexate (RHEUMATREX) 2.5 mg tablet Take 20 mg by mouth every Tuesday. Historical Provider 1/17/2017 Active Yes    metOLazone (ZAROXOLYN) 5 mg tablet Take 5 mg by mouth daily as needed (for weight increase of 2 lbs in 24 hrs for Heart Failure). Historical Provider  Active Yes    nitroglycerin (NITROSTAT) 0.4 mg SL tablet 1 Tab by SubLINGual route every five (5) minutes as needed for Chest Pain. CONNER Alberts Mc  Active Yes    omega-3 fatty acids-vitamin e (FISH OIL) 1,000 mg Cap Take 2 Caps by mouth two (2) times a week. CONNER Alberts Mc  Active Yes    ranolazine ER (RANEXA) 500 mg SR tablet Take 1 Tab by mouth two (2) times a day.  CONNER Alberts Mc 1/24/2017 am Active Yes    rosuvastatin (CRESTOR) 20 mg tablet Take 20 mg by mouth nightly. Historical Provider 1/23/2017 pm Active Yes    spironolactone (ALDACTONE) 25 mg tablet Take 25 mg by mouth daily.  Phys Other, MD 1/24/2017 am Active Yes                        Nicanor Cintron, [address]   Contact: [phone]

## 2017-01-25 NOTE — IP AVS SNAPSHOT
Current Discharge Medication List  
  
Take these medications at their scheduled times Dose & Instructions Dispensing Information Comments Morning Noon Evening Bedtime  
 aspirin 81 mg chewable tablet Your next dose is: Today, Tomorrow Other:  ____________ Dose:  81 mg Take 1 Tab by mouth daily. Refills:  0 BIDIL 20-37.5 mg per tablet Generic drug:  isosorbide-hydrALAZINE Your next dose is: Today, Tomorrow Other:  ____________ Dose:  1 Tab Take 1 Tab by mouth two (2) times a day. Refills:  0  
     
   
   
   
  
 bumetanide 1 mg tablet Commonly known as:  Ledon Lights Your next dose is: Today, Tomorrow Other:  ____________ Dose:  1 mg Take 1 Tab by mouth daily for 30 days. Quantity:  30 Tab Refills:  0 COREG 12.5 mg tablet Generic drug:  carvedilol Your next dose is: Today, Tomorrow Other:  ____________ Dose:  12.5 mg Take 12.5 mg by mouth two (2) times daily (with meals). Refills:  0  
     
   
   
   
  
 CRESTOR 20 mg tablet Generic drug:  rosuvastatin Your next dose is: Today, Tomorrow Other:  ____________ Dose:  20 mg Take 20 mg by mouth nightly. Refills:  0  
     
   
   
   
  
 doxycycline 100 mg tablet Commonly known as:  ADOXA Your next dose is: Today, Tomorrow Other:  ____________ Dose:  100 mg Take 100 mg by mouth two (2) times a day. Refills:  0  
     
   
   
   
  
 ergocalciferol 50,000 unit capsule Commonly known as:  ERGOCALCIFEROL Your next dose is: Today, Tomorrow Other:  ____________ Dose:  48223 Units Take 50,000 Units by mouth every Tuesday. Refills:  0  
     
   
   
   
  
 FISH OIL 1,000 mg Cap Generic drug:  omega-3 fatty acids-vitamin e Your next dose is: Today, Tomorrow Other:  ____________ Dose:  2 Cap Take 2 Caps by mouth two (2) times a week. Refills:  0  
     
   
   
   
  
 methotrexate 2.5 mg tablet Commonly known as:  Melania Flakes Your next dose is: Today, Tomorrow Other:  ____________ Dose:  20 mg Take 20 mg by mouth every Tuesday. Refills:  0  
     
   
   
   
  
 ORENCIA SC Your next dose is: Today, Tomorrow Other:  ____________  
   
   
 by SubCUTAneous route every four (4) weeks. Refills:  0  
     
   
   
   
  
 ranolazine  mg SR tablet Commonly known as:  RANEXA Your next dose is: Today, Tomorrow Other:  ____________ Dose:  500 mg Take 1 Tab by mouth two (2) times a day. Quantity:  60 Tab Refills:  3  
     
   
   
   
  
 spironolactone 25 mg tablet Commonly known as:  ALDACTONE Your next dose is: Today, Tomorrow Other:  ____________ Dose:  25 mg Take 25 mg by mouth daily. Refills:  0 Take these medications as needed Dose & Instructions Dispensing Information Comments Morning Noon Evening Bedtime  
  insulin pump Misc Commonly known as:  PATIENT SUPPLIED Your next dose is: Today, Tomorrow Other:  ____________  
   
   
 by SubCUTAneous route as needed. Refills:  0  
     
   
   
   
  
 benzonatate 100 mg capsule Commonly known as:  TESSALON Your next dose is: Today, Tomorrow Other:  ____________ Dose:  100 mg Take 100 mg by mouth three (3) times daily as needed for Cough. Refills:  0 HYDROcodone-acetaminophen 5-325 mg per tablet Commonly known as:  Viva Cloud Your next dose is: Today, Tomorrow Other:  ____________ Dose:  1 Tab Take 1 Tab by mouth every four (4) hours as needed for up to 10 doses. Max Daily Amount: 6 Tabs. Quantity:  10 Tab Refills:  0  
     
   
   
   
  
 metOLazone 5 mg tablet Commonly known as:  Rexene Limes Your next dose is: Today, Tomorrow Other:  ____________ Dose:  5 mg Take 5 mg by mouth daily as needed (for weight increase of 2 lbs in 24 hrs for Heart Failure). Refills:  0  
     
   
   
   
  
 nitroglycerin 0.4 mg SL tablet Commonly known as:  NITROSTAT Your next dose is: Today, Tomorrow Other:  ____________ Dose:  0.4 mg  
1 Tab by SubLINGual route every five (5) minutes as needed for Chest Pain. Quantity:  1 Bottle Refills:  prn PROAIR HFA 90 mcg/actuation inhaler Generic drug:  albuterol Your next dose is: Today, Tomorrow Other:  ____________ Dose:  2 Puff Take 2 Puffs by inhalation every six (6) hours as needed for Wheezing. Refills:  0 Take these medications as directed Dose & Instructions Dispensing Information Comments Morning Noon Evening Bedtime  
 insulin aspart 100 unit/mL Inpn Commonly known as:  Kash Robertson Your next dose is: Today, Tomorrow Other:  ____________  
   
   
 by SubCUTAneous route. Use in insulin pump Refills:  0 Where to Get Your Medications Information about where to get these medications is not yet available ! Ask your nurse or doctor about these medications  
  bumetanide 1 mg tablet HYDROcodone-acetaminophen 5-325 mg per tablet

## 2017-01-25 NOTE — DIABETES MGMT
Dear Alan Joshi,   Patients may remain on their insulin pumps if certain criteria are met:   -Please print and have sign insulin pump policy  -Insulin Pump Order Set #0081  -The patient is responsible for pump management  -Supplies provided by patient - net set change is today   -Please use our meter for blood sugars  -Every 3 days, the patient must change their set. The hospital must provide insulin (can be ordered through pharmacy) -next set change is today  -Please document basal rates and boluses on a column in the [facility] flowsheet  Thank you. Pee Butcher.  CASIE Aceves, [address] [place] [address]   [phone] (office)  [phone] (pager)

## 2017-01-25 NOTE — PROGRESS NOTES
Nebulizer approved. I will get nebulizer out of closet for pt.for pt to take home with her when discharged. [place] placed on AVS.Pt is in observation status. I reviewed the observation letter with pt and she electronically signed the observation letter.   Melania Pollack

## 2017-01-25 NOTE — ED NOTES
Bedside and Verbal shift change report given to Woodard Litten, RN (oncoming nurse) by SOL Mckenzie (offgoing nurse). Report included the following information SBAR, Kardex, ED Summary, Procedure Summary, Intake/Output, MAR and Recent Results.

## 2017-01-25 NOTE — ED PROVIDER NOTES
HPI Comments: 63 y.o. female with past medical history significant for SOFÍA, asthma, nonischemic cardiomyopathy, systolic CHF, HTN, DM, and CAD who presents from home via EMS  with chief complaint of SOB. Pt started with \"real bad\" SOB ~3 days ago that mildly improved after using her at home O2. She saw her PCP the following day and was given a steroid injection and sent home with an antibiotic and Xopenex. She has been taking the antibiotic, but was unable to fill her Xopenex prescription, so she took albuterol instead. She states her SOB was improved yesterday and she was feeling well until about 19:30 yesterday (~ 6 hours ago) when her SOB recurred accompanied by mid left sided chest pain described as \"feels like a baby elephant sitting on my chest\". Since then, pt notes her SOB and chest pain/pressure has been gradually worsening. She also describes associated diaphoresis, productive cough, and vomiting tonight after having a coughing spell. She was given a duo neb and 3 baby ASA via EMS. She is currently on coreg. She denies abdominal pain. There are no other acute medical concerns at this time. Social hx: Never smoker; rare EtOH use; no illicit drug use. Family Hx: family history includes Heart Attack and Heart Surgery in her father. PCP: Ashley Lopez MD    Note written by Aileen Cunningham, as dictated by Delicia Enriquez, DO 1:47 AM      The history is provided by the patient.         Past Medical History:   Diagnosis Date    Angina pectoris with normal coronary arteriogram ([facility]     CAD (coronary artery disease)      \"small vessel\" disease by cath at [facility] Nov 2010 (Kayleigh Yost)    DJD (degenerative joint disease) of hip      Dr Nazanin Arenas - recent steroid injection    DM (diabetes mellitus) ([address]     Hypertension     Hyperthyroidism     Nonischemic cardiomyopathy ([address]     SOFÍA (obstructive sleep apnea)     Systolic heart failure secondary to idiopathic cardiomyopathy ([address]        Past Surgical History:   Procedure Laterality Date    Hx gyn       Hysterectomy    Stress test lexiscan  12/2/10     no ischemia. EF 56%     Echo 2d adult  4/7/05     mild LVH. EF 55%    Cardiac catheterization  12/9/10     LVEDP 21 EF 55%, no epicardial disease. small vessel disease     Cardiac catheterization  10/31/11      LVEDP 23, mildly dilated LV with LVH, EF 35%, global HK, normal cors.  Echo 2d adult  1/27/12     EF 35-40%, septal dysynchrony         Family History:   Problem Relation Age of Onset    Heart Surgery Father     Heart Attack Father        Social History     Social History    Marital status:      Spouse name: N/A    Number of children: N/A    Years of education: N/A     Occupational History    Not on file. Social History Main Topics    Smoking status: Never Smoker    Smokeless tobacco: Not on file    Alcohol use Yes      Comment: rarely    Drug use: No    Sexual activity: Not on file     Other Topics Concern    Not on file     Social History Narrative     ALLERGIES: Pcn [penicillins] and Sulfa (sulfonamide antibiotics)    Review of Systems   Constitutional: Positive for diaphoresis. Negative for appetite change, chills, fever and unexpected weight change. HENT: Negative for ear pain, hearing loss, rhinorrhea and trouble swallowing. Eyes: Negative for pain and visual disturbance. Respiratory: Positive for cough and shortness of breath. Negative for chest tightness. Cardiovascular: Positive for chest pain (described as a heaviness). Negative for palpitations. Gastrointestinal: Positive for vomiting. Negative for abdominal distention, abdominal pain, blood in stool and nausea. Genitourinary: Negative for dysuria, hematuria and urgency. Musculoskeletal: Negative for back pain and myalgias. Skin: Negative for rash. Neurological: Negative for dizziness, syncope, weakness and numbness. Psychiatric/Behavioral: Negative for confusion and suicidal ideas. All other systems reviewed and are negative. Vitals:    01/25/17 0153 01/25/17 0158   BP: 140/84    Pulse: 96    Resp: 22 (!) 32   Temp: 98.9 °F (37.2 °C)    SpO2: 97%    Weight: 86.2 kg (190 lb)    Height: 5' 5\" (1.651 m)             Physical Exam   Constitutional: She is oriented to person, place, and time. She appears well-developed and well-nourished. No distress. HENT:   Head: Normocephalic and atraumatic. Right Ear: External ear normal.   Left Ear: External ear normal.   Nose: Nose normal.   Mouth/Throat: Oropharynx is clear and moist. No oropharyngeal exudate. Eyes: Conjunctivae and EOM are normal. Pupils are equal, round, and reactive to light. Right eye exhibits no discharge. Left eye exhibits no discharge. No scleral icterus. Neck: Normal range of motion. Neck supple. No JVD present. No tracheal deviation present. Cardiovascular: Normal rate, regular rhythm, normal heart sounds and intact distal pulses. Exam reveals no gallop and no friction rub. No murmur heard. Pulmonary/Chest: Effort normal. No stridor. No respiratory distress. She has decreased breath sounds in the right lower field and the left lower field. She has wheezes (faint expiratory) in the left upper field. She has no rhonchi. She has no rales. She exhibits tenderness (anterior chest wall). Abdominal: Soft. Bowel sounds are normal. She exhibits no distension. There is no tenderness. There is no rebound and no guarding. Musculoskeletal: Normal range of motion. She exhibits edema (1+ lower extremities bilaterally). She exhibits no tenderness. Neurological: She is alert and oriented to person, place, and time. She has normal strength and normal reflexes. No cranial nerve deficit or sensory deficit. She exhibits normal muscle tone. Coordination normal. GCS eye subscore is 4. GCS verbal subscore is 5. GCS motor subscore is 6. Skin: Skin is warm and dry. No rash noted. She is not diaphoretic. No erythema.  No pallor. Psychiatric: She has a normal mood and affect. Her behavior is normal. Judgment and thought content normal.   Nursing note and vitals reviewed. Note written by Shelton Carrillo. Kym Hurst, as dictated by Anna Martins, DO 1:47 AM    MDM  Number of Diagnoses or Management Options  Chest pain, unspecified type:   Elevated brain natriuretic peptide (BNP) level:   Elevated troponin:   SOB (shortness of breath):      Amount and/or Complexity of Data Reviewed  Clinical lab tests: ordered and reviewed  Tests in the radiology section of CPT®: ordered and reviewed  Tests in the medicine section of CPT®: ordered and reviewed  Discuss the patient with other providers: yes (Hospitalist)  Independent visualization of images, tracings, or specimens: yes (ekg)    Risk of Complications, Morbidity, and/or Mortality  Presenting problems: moderate  Diagnostic procedures: low  Management options: moderate    Patient Progress  Patient progress: stable    ED Course       Procedures  Chief Complaint   Patient presents with    Shortness of Breath       5:55 AM  The patients presenting problems have been discussed, and they are in agreement with the care plan formulated and outlined with them. I have encouraged them to ask questions as they arise throughout their visit.     MEDICATIONS GIVEN:  Medications   sodium chloride (NS) flush 5-10 mL (10 mL IntraVENous Given 1/25/17 0327)   sodium chloride (NS) flush 5-10 mL (not administered)   albuterol-ipratropium (DUO-NEB) 2.5 MG-0.5 MG/3 ML (3 mL Nebulization Given 1/25/17 0210)   magnesium sulfate 2 g/50 ml IVPB (premix or compounded) (0 g IntraVENous IV Completed 1/25/17 0242)   methylPREDNISolone (PF) (SOLU-MEDROL) injection 125 mg (125 mg IntraVENous Given 1/25/17 0217)   bumetanide (BUMEX) injection 1 mg (1 mg IntraVENous Given 1/25/17 0326)   benzonatate (TESSALON) capsule 100 mg (100 mg Oral Given 1/25/17 0414)   albuterol-ipratropium (DUO-NEB) 2.5 MG-0.5 MG/3 ML (3 mL Nebulization Given 1/25/17 0414)   nitroglycerin (NITROBID) 2 % ointment 1 Inch (1 Inch Topical Given 1/25/17 0522)   morphine injection 2 mg (2 mg IntraVENous Given 1/25/17 7922)   aspirin chewable tablet 324 mg (324 mg Oral Given 1/25/17 1469)       LABS REVIEWED:  Recent Results (from the past 24 hour(s))   CBC WITH AUTOMATED DIFF    Collection Time: 01/25/17  2:08 AM   Result Value Ref Range    WBC 10.4 3.6 - 11.0 K/uL    RBC 4.39 3.80 - 5.20 M/uL    HGB 12.3 11.5 - 16.0 g/dL    HCT 38.6 35.0 - 47.0 %    MCV 87.9 80.0 - 99.0 FL    MCH 28.0 26.0 - 34.0 PG    MCHC 31.9 30.0 - 36.5 g/dL    RDW 14.0 11.5 - 14.5 %    PLATELET 686 720 - 971 K/uL    NEUTROPHILS 72 32 - 75 %    LYMPHOCYTES 23 12 - 49 %    MONOCYTES 4 (L) 5 - 13 %    EOSINOPHILS 1 0 - 7 %    BASOPHILS 0 0 - 1 %    ABS. NEUTROPHILS 7.5 1.8 - 8.0 K/UL    ABS. LYMPHOCYTES 2.4 0.8 - 3.5 K/UL    ABS. MONOCYTES 0.5 0.0 - 1.0 K/UL    ABS. EOSINOPHILS 0.1 0.0 - 0.4 K/UL    ABS. BASOPHILS 0.0 0.0 - 0.1 K/UL   METABOLIC PANEL, COMPREHENSIVE    Collection Time: 01/25/17  2:08 AM   Result Value Ref Range    Sodium 142 136 - 145 mmol/L    Potassium 4.2 3.5 - 5.1 mmol/L    Chloride 103 97 - 108 mmol/L    CO2 30 21 - 32 mmol/L    Anion gap 9 5 - 15 mmol/L    Glucose 296 (H) 65 - 100 mg/dL    BUN 19 6 - 20 MG/DL    Creatinine 0.91 0.55 - 1.02 MG/DL    BUN/Creatinine ratio 21 (H) 12 - 20      GFR est AA >60 >60 ml/min/1.73m2    GFR est non-AA >60 >60 ml/min/1.73m2    Calcium 8.7 8.5 - 10.1 MG/DL    Bilirubin, total 0.3 0.2 - 1.0 MG/DL     (H) 12 - 78 U/L     (H) 15 - 37 U/L    Alk.  phosphatase 77 45 - 117 U/L    Protein, total 7.0 6.4 - 8.2 g/dL    Albumin 3.3 (L) 3.5 - 5.0 g/dL    Globulin 3.7 2.0 - 4.0 g/dL    A-G Ratio 0.9 (L) 1.1 - 2.2     TROPONIN I    Collection Time: 01/25/17  2:08 AM   Result Value Ref Range    Troponin-I, Qt. 0.06 (H) <0.05 ng/mL   MAGNESIUM    Collection Time: 01/25/17  2:08 AM   Result Value Ref Range    Magnesium 1.8 1.6 - 2.4 mg/dL   PRO-BNP    Collection Time: 01/25/17  2:18 AM   Result Value Ref Range    NT pro-BNP 1106 (H) 0 - 125 PG/ML   TROPONIN I    Collection Time: 01/25/17  4:16 AM   Result Value Ref Range    Troponin-I, Qt. 0.10 (H) <0.05 ng/mL   GLUCOSE, POC    Collection Time: 01/25/17  4:18 AM   Result Value Ref Range    Glucose (POC) 225 (H) 65 - 100 mg/dL    Performed by [address]:  Patient Vitals for the past 12 hrs:   Temp Pulse Resp BP SpO2   01/25/17 0522 - 74 - 146/83 -   01/25/17 0511 - - - - 96 %   01/25/17 0509 - 86 - 146/83 -   01/25/17 0326 - 76 - 133/76 -   01/25/17 0234 - - - - 97 %   01/25/17 0230 - 84 20 125/79 95 %   01/25/17 0200 - 85 22 146/88 97 %   01/25/17 0158 - - (!) 32 - -   01/25/17 0153 98.9 °F (37.2 °C) 96 22 140/84 97 %       RADIOLOGY RESULTS:  The following have been ordered and reviewed:  XR CHEST PORT   Final Result        Study Result      Clinical history: Chest pain  INDICATION: Chest pain     COMPARISON: 10/31/2011     FINDINGS:   AP semiupright view of the chest is obtained . The cardiopericardial silhouette  is stable. There is no pleural effusion, pneumothorax or focal consolidation  present. Mild interstitial edema and cardiomegaly.     IMPRESSION  IMPRESSION:  Mild interstitial edema and cardiomegaly. ED EKG interpretation:  Rhythm: normal sinus rhythm and incomplete LBBB; and regular . Rate (approx.): 94; Axis: normal; P wave: normal; QRS interval: prolonged; ST/T wave: non-specific changes; Other findings: abnormal ekg. This EKG was interpreted by Sweta Nelson DO,ED Provider. CONSULTATIONS:   Hospitalist    PROGRESS NOTES:  Discussed results and plan with patient. Patient will be admitted/observed for further evaluation and treatment. DIAGNOSIS:    1. Chest pain, unspecified type    2. SOB (shortness of breath)    3. Elevated troponin    4.  Elevated brain natriuretic peptide (BNP) level        PLAN:  Admit/obs    ED COURSE: The patients hospital course has been uncomplicated.

## 2017-01-26 ENCOUNTER — APPOINTMENT (OUTPATIENT)
Dept: CT IMAGING | Age: 62
End: 2017-01-26
Attending: INTERNAL MEDICINE
Payer: COMMERCIAL

## 2017-01-26 VITALS
OXYGEN SATURATION: 97 % | HEIGHT: 65 IN | SYSTOLIC BLOOD PRESSURE: 120 MMHG | DIASTOLIC BLOOD PRESSURE: 75 MMHG | WEIGHT: 194.67 LBS | TEMPERATURE: 97.6 F | RESPIRATION RATE: 16 BRPM | HEART RATE: 71 BPM | BODY MASS INDEX: 32.43 KG/M2

## 2017-01-26 PROBLEM — R11.10 VOMITING: Status: RESOLVED | Noted: 2017-01-25 | Resolved: 2017-01-26

## 2017-01-26 PROBLEM — R05.9 COUGH: Status: RESOLVED | Noted: 2017-01-25 | Resolved: 2017-01-26

## 2017-01-26 PROBLEM — R07.89 CHEST PAIN, ATYPICAL: Status: ACTIVE | Noted: 2017-01-25

## 2017-01-26 LAB
ANION GAP BLD CALC-SCNC: 4 MMOL/L (ref 5–15)
BACTERIA SPEC CULT: NORMAL
BNP SERPL-MCNC: 357 PG/ML (ref 0–125)
BUN SERPL-MCNC: 20 MG/DL (ref 6–20)
BUN/CREAT SERPL: 25 (ref 12–20)
CALCIUM SERPL-MCNC: 8.3 MG/DL (ref 8.5–10.1)
CC UR VC: NORMAL
CHLORIDE SERPL-SCNC: 105 MMOL/L (ref 97–108)
CO2 SERPL-SCNC: 31 MMOL/L (ref 21–32)
CREAT SERPL-MCNC: 0.79 MG/DL (ref 0.55–1.02)
ERYTHROCYTE [DISTWIDTH] IN BLOOD BY AUTOMATED COUNT: 14.2 % (ref 11.5–14.5)
GLUCOSE BLD STRIP.AUTO-MCNC: 80 MG/DL (ref 65–100)
GLUCOSE BLD STRIP.AUTO-MCNC: 84 MG/DL (ref 65–100)
GLUCOSE SERPL-MCNC: 106 MG/DL (ref 65–100)
HCT VFR BLD AUTO: 35 % (ref 35–47)
HGB BLD-MCNC: 11.3 G/DL (ref 11.5–16)
MAGNESIUM SERPL-MCNC: 2.1 MG/DL (ref 1.6–2.4)
MCH RBC QN AUTO: 27.9 PG (ref 26–34)
MCHC RBC AUTO-ENTMCNC: 32.3 G/DL (ref 30–36.5)
MCV RBC AUTO: 86.4 FL (ref 80–99)
PLATELET # BLD AUTO: 315 K/UL (ref 150–400)
POTASSIUM SERPL-SCNC: 3.6 MMOL/L (ref 3.5–5.1)
RBC # BLD AUTO: 4.05 M/UL (ref 3.8–5.2)
SERVICE CMNT-IMP: NORMAL
SODIUM SERPL-SCNC: 140 MMOL/L (ref 136–145)
WBC # BLD AUTO: 9.7 K/UL (ref 3.6–11)

## 2017-01-26 PROCEDURE — 94640 AIRWAY INHALATION TREATMENT: CPT

## 2017-01-26 PROCEDURE — 74011000250 HC RX REV CODE- 250: Performed by: INTERNAL MEDICINE

## 2017-01-26 PROCEDURE — 85027 COMPLETE CBC AUTOMATED: CPT | Performed by: INTERNAL MEDICINE

## 2017-01-26 PROCEDURE — 74011250637 HC RX REV CODE- 250/637: Performed by: NURSE PRACTITIONER

## 2017-01-26 PROCEDURE — 71275 CT ANGIOGRAPHY CHEST: CPT

## 2017-01-26 PROCEDURE — 99218 HC RM OBSERVATION: CPT

## 2017-01-26 PROCEDURE — 82962 GLUCOSE BLOOD TEST: CPT

## 2017-01-26 PROCEDURE — 74011250637 HC RX REV CODE- 250/637: Performed by: INTERNAL MEDICINE

## 2017-01-26 PROCEDURE — 83880 ASSAY OF NATRIURETIC PEPTIDE: CPT | Performed by: NURSE PRACTITIONER

## 2017-01-26 PROCEDURE — 83735 ASSAY OF MAGNESIUM: CPT | Performed by: INTERNAL MEDICINE

## 2017-01-26 PROCEDURE — 36415 COLL VENOUS BLD VENIPUNCTURE: CPT | Performed by: INTERNAL MEDICINE

## 2017-01-26 PROCEDURE — 74011250636 HC RX REV CODE- 250/636: Performed by: INTERNAL MEDICINE

## 2017-01-26 PROCEDURE — 74011636320 HC RX REV CODE- 636/320

## 2017-01-26 PROCEDURE — 96372 THER/PROPH/DIAG INJ SC/IM: CPT

## 2017-01-26 PROCEDURE — 80048 BASIC METABOLIC PNL TOTAL CA: CPT | Performed by: INTERNAL MEDICINE

## 2017-01-26 RX ORDER — BUMETANIDE 1 MG/1
1 TABLET ORAL DAILY
Status: DISCONTINUED | OUTPATIENT
Start: 2017-01-26 | End: 2017-01-26 | Stop reason: HOSPADM

## 2017-01-26 RX ORDER — POTASSIUM CHLORIDE 750 MG/1
20 TABLET, FILM COATED, EXTENDED RELEASE ORAL
Status: COMPLETED | OUTPATIENT
Start: 2017-01-26 | End: 2017-01-26

## 2017-01-26 RX ORDER — BUMETANIDE 1 MG/1
1 TABLET ORAL DAILY
Qty: 30 TAB | Refills: 0 | Status: SHIPPED | OUTPATIENT
Start: 2017-01-26 | End: 2017-02-25

## 2017-01-26 RX ORDER — HYDROCODONE BITARTRATE AND ACETAMINOPHEN 5; 325 MG/1; MG/1
1 TABLET ORAL
Qty: 10 TAB | Refills: 0 | Status: SHIPPED | OUTPATIENT
Start: 2017-01-26 | End: 2017-01-26

## 2017-01-26 RX ADMIN — HYDROCODONE BITARTRATE AND ACETAMINOPHEN 1 TABLET: 5; 325 TABLET ORAL at 12:50

## 2017-01-26 RX ADMIN — IPRATROPIUM BROMIDE AND ALBUTEROL SULFATE 3 ML: .5; 2.5 SOLUTION RESPIRATORY (INHALATION) at 05:38

## 2017-01-26 RX ADMIN — SPIRONOLACTONE 25 MG: 25 TABLET ORAL at 08:56

## 2017-01-26 RX ADMIN — ENOXAPARIN SODIUM 40 MG: 40 INJECTION SUBCUTANEOUS at 08:15

## 2017-01-26 RX ADMIN — POTASSIUM CHLORIDE 20 MEQ: 750 TABLET, FILM COATED, EXTENDED RELEASE ORAL at 12:48

## 2017-01-26 RX ADMIN — HYDRALAZINE HYDROCHLORIDE 37.5 MG: 25 TABLET, FILM COATED ORAL at 08:57

## 2017-01-26 RX ADMIN — ISOSORBIDE DINITRATE 20 MG: 20 TABLET ORAL at 08:56

## 2017-01-26 RX ADMIN — DOXYCYCLINE HYCLATE 100 MG: 100 TABLET, COATED ORAL at 08:15

## 2017-01-26 RX ADMIN — RANOLAZINE 500 MG: 500 TABLET, FILM COATED, EXTENDED RELEASE ORAL at 08:15

## 2017-01-26 RX ADMIN — BUMETANIDE 1 MG: 1 TABLET ORAL at 14:31

## 2017-01-26 RX ADMIN — IOPAMIDOL 90 ML: 755 INJECTION, SOLUTION INTRAVENOUS at 14:06

## 2017-01-26 RX ADMIN — HYDROCODONE BITARTRATE AND ACETAMINOPHEN 1 TABLET: 5; 325 TABLET ORAL at 04:58

## 2017-01-26 RX ADMIN — ASPIRIN 81 MG CHEWABLE TABLET 81 MG: 81 TABLET CHEWABLE at 08:15

## 2017-01-26 RX ADMIN — Medication 10 ML: at 05:00

## 2017-01-26 RX ADMIN — CARVEDILOL 12.5 MG: 12.5 TABLET, FILM COATED ORAL at 08:15

## 2017-01-26 RX ADMIN — Medication 10 ML: at 14:59

## 2017-01-26 NOTE — DISCHARGE INSTRUCTIONS
HOSPITALIST DISCHARGE INSTRUCTIONS    NAME: August Osgood   :  1955   MRN:  [id number]     Date:     2017    ADMIT DATE: 2017     DISCHARGE DATE: 2017     PRINCIPAL ADMITTING DIAGNOSIS:  chest pain    DISCHARGE DIAGNOSES:  Principal Problem:    Chest pain, atypical (2017)    CAD (coronary artery disease) (10/31/2011)    HTN (hypertension) (10/31/2011)    Nonischemic cardiomyopathy ([facility] (2010)    Dyslipidemia (2011)        DM type 2 causing vascular disease ([facility]         Sleep apnea     Hyperthyroidism (2011)    Osteoarthritis of hip    Dyspnea (2017)    Elevated LFTs ()    Systolic CHF, acute on chronic ([facility] (2017)    MEDICATIONS:    · It is important that medications are taken exactly as they are prescribed on the discharge medication instructions and keep them your  in the bottles provided by the pharmacist.   · Keep a list of the medication names, dosages, and times to be taken at all times. · Do not take other medications without consulting your doctor. Recommended diet:  Cardiac, low salt  Diet    Recommended activity: Activity as tolerated    Post discharge care:    Notify follow up health care provider or return to the emergency department if you cannot get hold of your doctor if you feel worse or experience symptoms similar to those that brought you to hospital    Follow-up Information     Follow up With Details Comments 25[address] Jamal Delong MD In 1 week  [address] for [address]  [phone]      Provider Unknown   Patient not available to ask            Information obtained by :  I understand that if any problems occur once I am at home I am to contact my physician and I understand and acknowledge receipt of the instructions indicated above. Physician's or R.N.'s Signature                                                                  Date/Time                                                                                                                                              Patient or Representative Signature                                                          Date/Time

## 2017-01-26 NOTE — PROGRESS NOTES
Bedside shift change report given to SOL Larsen (oncoming nurse) by Lashay Brandt RN (offgoing nurse). Report included the following information SBAR, Kardex, Intake/Output, MAR, Recent Results, Med Rec Status and Cardiac Rhythm SR/SB.

## 2017-01-26 NOTE — DIABETES MGMT
DTC Pump and Consult Note Follow-Up    Recommendations/ Comments:     Pt changed her site today. Discussed with nurse and pt importance of ongoing monitoring, especially after site change. Pt states she is comfortable and declined going on a temporary basal. Glucose was > 200, but is not within target range. Will continue to follow. Consult received for:  [x]          Assessment of home management     []          Meter/monitoring     []          Insulin instruction     []          New diagnosis     []          Outpatient education     [x]          Insulin pump patient     []          Insulin infusion     []          DKA/HHS    Chart reviewed and initial evaluation complete on Rajendra Alexander. Patient is a 63 y.o. female with a past medical history of DM - pump user x 5 years. Insulin Pump Settings    Pt on Medtronic Minimed (Type of Pump) and uses Hugh (5mm) (Type of infusion set). Date of last site change Monday - next set change is due today. Pt states her  will be bringing supplies ~ 4 pm.     Basal Rates     Insulin Carb Ratios  0000 to 1259 = 2.200 units/hr   0000 to 0100 = 1 unit per 10 g   1300 to 2359 = 2.4500 units/hr   0100 to 2359 = 1 unit per 19 g   Total 24 hour basal dose = 55.550   Insulin Sensitivity Factors  1 unit per 30 mg/dl     Target BG  100 - 110 mg/dl      BG monitoring at home 3-6 times per day. Patient reports BG levels at home - has a sensor. {If no to any of the following the pump should be d/c per hospital policy:  · Patient able to program doctors ordered settings,   yes [x]            No   []                · Patient able to provide pump materials  yes [x]              No   []            · Able to change infusion set and fill a new syringe at least for 3 days ( 2 days if pregnant.   yes [x]              No   []            · Patient will change setting if redness, swelling, blood backing up, pump alerts, \"No Delivery\" alarm, or two or more glucose reading in a row greater than 250mg/dl    yes [x]              No   []           · Patient able to repeat basal rates [x]              No   []            · Patient able to bolus corrections and meals based on physician orders. yes [x]         No   []              · Assessed and instructed patient on the following: . ·  interpretation of lab results, blood sugar goals, use of sliding scale/correction formula and steroid use in hospital    Encouraged the following: communicate with nurse during hospital stay about boluses. Provided patient with the following: [x]          Survival skills education materials               []          Insulin education materials               []          CHO counting education materials               []          Outpatient DTC contact number               []          Glucometer               []          Insulin start kit- vial/syringe               []          Insulin start kit- pen    A1c:   Lab Results   Component Value Date/Time    Hemoglobin A1c 7.9 01/25/2017 10:18 AM       Recent Glucose Results:   Lab Results   Component Value Date/Time     (H) 01/26/2017 04:52 AM    GLUCPOC 84 01/26/2017 08:04 AM    GLUCPOC 227 (H) 01/25/2017 09:21 PM    GLUCPOC 220 (H) 01/25/2017 04:57 PM        Lab Results   Component Value Date/Time    Creatinine 0.79 01/26/2017 04:52 AM       Active Orders   Diet    DIET CARDIAC Regular; 2 GM NA (House Low NA); Consistent Carb 1800kcal        PO intake:   Patient Vitals for the past 72 hrs:   % Diet Eaten   01/26/17 0800 50 %       Current hospital DM medication: insulin pump       Thank you. Suzy Rajan.  CASIE Edwards, [address] [place] [address]   [phone] (office)  [phone] (pager)

## 2017-01-26 NOTE — PROGRESS NOTES
Cardiology Progress Note     [facility]     NAME:  Cheikh Dewitt   :   1955   MRN:   [id number]     Assessment/Plan:   1. Atypical chest pain: trops flat. Pain exacerbated by coughing and deep inspiration. Better with pain meds. Needs to mobilize. 2. NICM: followed closely as OP by Dr Kayleigh Yost and Dr Jesus Perry. Last LVEF assessment she believes revealed an EF of 28%. Reassessment by ECHO this admission was 25%. She has follow up with Dr Kayleigh Yost  . BB/ hydralazine/imdur  3. A/C systolic CHF: better after IV diuretics. Add bumex 1 mg every day. Use prn zaroxolyn at home for weight gain > 2#. Ck pBNP. Daily weights, I/O.   4. CAD: cont ranexa, Imdur, BB ,statin , ASA  5. Recent URI:  6. DM  7. SOFÍA  8. HTN: hydralazine      Pt personally seen and examined. Chart reviewed. Agree with advanced NP's history, exam and  A/P with changes/additons  Ambulate    Edith Castellano MD, [facility] [place]      Subjective:   Cheikh Dewitt is a 63 y.o.  [place] female with PMH significant for NICM, CAD, HTN, Type 2 DM, SOFÍA wears oxygen nightly, dyslipidemia who presented to the Emergency Department complaining of dyspnea. It has worsened over the past week. Failed to improve to outpatient steroids and antibiotics (azithro and levaquin). Last night she woke up with chest pain , excessive coughing and wheezing, sat down for a bit then took a shower the pain radiated through her chest to her back and shoulders with audible wheezing. Pain in worse with palpation and deep inspiration this am. NTP has not changed the pain In ED chest xray showed Mild interstitial edema and cardiomegaly. She has been treated with bumex 1 mg IV. She follows with Dr Kayleigh [facility] at [facility] and Dr Jesus Perry at [facility]. Most recent visit was Dec 2016. Cath in 2016. with Dr Jesus Perry.  Noted weight gain 2 # earlier this week.           Cardiac ROS: Patient denies any exertional chest pain, dyspnea, palpitations, syncope, orthopnea, edema or paroxysmal nocturnal dyspnea. Previous Cardiac Eval  ECHO 17:  Left ventricle: Size was normal. Systolic function was severely reduced. Ejection fraction was estimated to be 25 %. There was moderate diffuse  hypokinesis with distinct regional wall motion abnormalities. There was  severe hypokinesis of the apical septal and apical wall(s). Intracavitary  echoes most suggestive of apical trabeculations were present. The  trabeculation was abnormal. 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 Right ventricle: The size was normal. Systolic function was normal.    Mitral valve: There was mild regurgitation. Regurgitation grade was 1+ on  a scale of 0 to 4+. Review of Systems: No nausea, indigestion, vomiting, pain, cough, sputum. No bleeding. Taking po. Appetite fair. Objective:     Visit Vitals    /75    Pulse 72    Temp 97.6 °F (36.4 °C)    Resp 13    Ht 5' 5\" (1.651 m)    Wt 194 lb 10.7 oz (88.3 kg)    SpO2 95%    Breastfeeding No    BMI 32.39 kg/m2      O2 Device: Room air    Temp (24hrs), Av °F (36.7 °C), Min:97.5 °F (36.4 °C), Max:98.4 °F (36.9 °C)            1901 -  0700  In: 480 [P.O.:480]  Out: -   TELE: SR     General: AAOx3 cooperative, no acute distress. HEENT: Atraumatic. Pink and moist.  Anicteric sclerae. Neck : Supple, no thyromegaly. Lungs: Dim bases , no wheezing. Chest wall tender over sternum. Heart: Regular rhythm, no murmur, no rubs, no gallops. No JVD. No carotid bruits. Abdomen: Insulin pump. Soft, non-distended, non-tender. + Bowel sounds. No bruits. Extremities: No edema, no clubbing, no cyanosis. No calf tenderness  Neurologic: Grossly intact. Alert and oriented X 3. No acute neurological distress. Psych: Good insight. Not anxious or agitated.         Care Plan discussed with:    Comments   Patient x    Family      RN x    Care Manager                    Consultant:  alicia attending       Data Review:     No lab exists for component: ITNL   Recent Labs      01/25/17   1018  01/25/17   0416  01/25/17   0208   TROIQ  0.06*  0.10*  0.06*     Recent Labs      01/26/17   0452  01/25/17   0208   NA  140  142   K  3.6  4.2   CL  105  103   CO2  31  30   BUN  20  19   CREA  0.79  0.91   GLU  106*  296*   MG  2.1  1.8   ALB   --   3.3*   WBC  9.7  10.4   HGB  11.3*  12.3   HCT  35.0  38.6   PLT  315  293     No results for input(s): INR, PTP, APTT in the last 72 hours.     No lab exists for component: INREXT    Medications reviewed  Current Facility-Administered Medications   Medication Dose Route Frequency    sodium chloride (NS) flush 5-10 mL  5-10 mL IntraVENous Q8H    sodium chloride (NS) flush 5-10 mL  5-10 mL IntraVENous PRN    aspirin chewable tablet 81 mg  81 mg Oral DAILY    ranolazine ER (RANEXA) tablet 500 mg  500 mg Oral BID    rosuvastatin (CRESTOR) tablet 20 mg  20 mg Oral DAILY    spironolactone (ALDACTONE) tablet 25 mg  25 mg Oral DAILY    sodium chloride (NS) flush 5-10 mL  5-10 mL IntraVENous Q8H    sodium chloride (NS) flush 5-10 mL  5-10 mL IntraVENous PRN    naloxone (NARCAN) injection 0.4 mg  0.4 mg IntraVENous PRN    glucose chewable tablet 16 g  4 Tab Oral PRN    dextrose (D50W) injection syrg 12.5-25 g  12.5-25 g IntraVENous PRN    glucagon (GLUCAGEN) injection 1 mg  1 mg IntraMUSCular PRN    acetaminophen (TYLENOL) tablet 650 mg  650 mg Oral Q4H PRN    HYDROcodone-acetaminophen (NORCO) 5-325 mg per tablet 1 Tab  1 Tab Oral Q4H PRN    diphenhydrAMINE (BENADRYL) capsule 25 mg  25 mg Oral Q4H PRN    ondansetron (ZOFRAN) injection 4 mg  4 mg IntraVENous Q4H PRN    enoxaparin (LOVENOX) injection 40 mg  40 mg SubCUTAneous Q24H    carvedilol (COREG) tablet 12.5 mg  12.5 mg Oral BID WITH MEALS    doxycycline (VIBRA-TABS) tablet 100 mg  100 mg Oral BID    isosorbide dinitrate (ISORDIL) tablet 20 mg  20 mg Oral BID    hydrALAZINE (APRESOLINE) tablet 37.5 mg  37.5 mg Oral BID    albuterol-ipratropium (DUO-NEB) 2.5 MG-0.5 MG/3 ML  3 mL Nebulization Q6H PRN         Geo Farmer NP

## 2017-01-26 NOTE — PROGRESS NOTES
Discharge instructions discussed with patient. Verbalized understanding. New prescribed medications discussed. Opportunity to ask questions given. IV removed. Signature obtained. Copies given.

## 2017-01-26 NOTE — NURSE NAVIGATOR
Chart reviewed by Heart Failure Nurse Navigator. Heart Failure database completed. EF 25%. ACEi/ARB:none. Aldosterone Receptor Antagonist: Spironolactone 25 mg daily. BB: Coreg 12.5 mg BID. CRT-not currently indicated. NYHA Functional Class III. Heart Failure Teach Back in Patient Education. Heart Failure Avoiding Triggers on Discharge Instructions. Usual cardiologist: Dr Maged Ponce.

## 2017-01-26 NOTE — DISCHARGE SUMMARY
[facility]  [address] [facility], [address] [place] [address]  Tel: [phone]    Physician Discharge Summary    Patient ID:  Chris Badillo  Age: 63 y.o.    : 1955  MRN: [id number]     PCP: PROVIDER UNKNOWN     Admit date: 2017    Discharge date: 2017    Principal admission Diagnosis:   chest pain    Discharge Diagnoses:  Principal Problem:    Chest pain, atypical (1571)    Systolic CHF, acute on chronic ([facility] (2017)    CAD (coronary artery disease) (10/31/2011): Overview: \"small vessel\" disease by cath 2010 ([address])    HTN (hypertension) (10/31/2011)    Nonischemic cardiomyopathy ([facility] (2010): Overview: CATH: 10/31/11: LVEDP 23, mildly dilated LV with LVH,     EF 35%, global HK, normal cors. Dyslipidemia (2011): Overview: Dr. Fatemeh Cornejo     DM type 2 causing vascular disease [facility]): Overview: Dr. Fatemeh Cornejo     Sleep apnea: Overview: Not using CPAP, she is seeing Dr. Ferny Reid next week. Hyperthyroidism (2011): Overview: Dr. Fatemeh Cornejo     Osteoarthritis of hip ()    Dyspnea (2017)    Elevated LFTs (2017)     Consults: Cardiology    Hospital Course:     Ms. Edgar Wells is a 63 y.o. admitted to [facility] and treated for the following:    Chest pain - POA, likely musculoskeletal from protracted coughing. Its reproducible. Echo showed an EF of 25%, CTA chest was negative for PE and any other acute finding. Seen by cardiology who recommended no further testing. This si respond to analgesics. She was discharged home in stable condition to follow up with her primary cardiologist on cardiac medications as below.       CAD (coronary artery disease) / HTN (hypertension):  Per chart she has \"small vessel\" disease by cath 2010 (at [address]). Reviewed by cardiology as noted above.  She will continue with irbesartan, statin, ranolazine, IMDUR, coreg, ASA, spironolactone     Systolic heart failure secondary to idiopathic cardiomyopathy / Nonischemic cardiomyopathy / pulmonary edema / Dyspnea / Cough - 2011 EF 35%, global HK. Repeat Echo as indicated above. Responded well to some diuresis. No further testing recommended. Out patient follow up. DM type 2 causing vascular disease: Diabetic diet and counseling. A1c 7.9. Continue with her insulin pump. Out patient follow up with her PCP.     Sleep apnea: resume CPAP at night.      Dyslipidemia: continue rosuvastatin.      Hyperthyroidism: Continue tapazole and TSH was normal     Osteoarthritis of hip: Tylenol prn     Vomiting: Secondary to cough, most likely. Resolved.     Elevated LFTs : mild. Suspect mild vascular congestion, and vomiting. Out patient follow up.     GERD: Continue PPI    Discharge Exam:    Visit Vitals    /75 (BP 1 Location: Left arm, BP Patient Position: At rest)    Pulse 71    Temp 97.6 °F (36.4 °C)    Resp 16    Ht 5' 5\" (1.651 m)    Wt 88.3 kg (194 lb 10.7 oz)    SpO2 97%    Breastfeeding No    BMI 32.39 kg/m2      General: well looking and stable patient in no acute distress  Pulm: clear breath sounds without wheezes  Card: no murmurs, normal S1, S2 without thrills, bruits   Abd:    soft, non-tender, normoactive bowel sounds  Skin: no rashes or ulcers, skin turgor is good  Neuro: awake, alert and has a non focal     Activity: Activity as tolerated    Diet: Cardiac Diet    Current Discharge Medication List      START taking these medications    Details   bumetanide (BUMEX) 1 mg tablet Take 1 Tab by mouth daily for 30 days. Qty: 30 Tab, Refills: 0         CONTINUE these medications which have NOT CHANGED    Details   carvedilol (COREG) 12.5 mg tablet Take 12.5 mg by mouth two (2) times daily (with meals). ergocalciferol (ERGOCALCIFEROL) 50,000 unit capsule Take 50,000 Units by mouth every Tuesday. insulin pump (PATIENT SUPPLIED) [facility] by SubCUTAneous route as needed. albuterol (PROAIR HFA) 90 mcg/actuation inhaler Take 2 Puffs by inhalation every six (6) hours as needed for Wheezing. methotrexate (RHEUMATREX) 2.5 mg tablet Take 20 mg by mouth every Tuesday. metOLazone (ZAROXOLYN) 5 mg tablet Take 5 mg by mouth daily as needed (for weight increase of 2 lbs in 24 hrs for Heart Failure). benzonatate (TESSALON) 100 mg capsule Take 100 mg by mouth three (3) times daily as needed for Cough. isosorbide-hydrALAZINE (BIDIL) 20-37.5 mg per tablet Take 1 Tab by mouth two (2) times a day. doxycycline (ADOXA) 100 mg tablet Take 100 mg by mouth two (2) times a day. ABATACEPT (ORENCIA SC) by SubCUTAneous route every four (4) weeks. rosuvastatin (CRESTOR) 20 mg tablet Take 20 mg by mouth nightly. ranolazine ER (RANEXA) 500 mg SR tablet Take 1 Tab by mouth two (2) times a day. Qty: 60 Tab, Refills: 3      aspirin 81 mg chewable tablet Take 1 Tab by mouth daily. nitroglycerin (NITROSTAT) 0.4 mg SL tablet 1 Tab by SubLINGual route every five (5) minutes as needed for Chest Pain. Qty: 1 Bottle, Refills: prn      omega-3 fatty acids-vitamin e (FISH OIL) 1,000 mg Cap Take 2 Caps by mouth two (2) times a week. spironolactone (ALDACTONE) 25 mg tablet Take 25 mg by mouth daily. insulin aspart (NOVOLOG) 100 unit/mL flexpen by SubCUTAneous route. Use in insulin pump             Follow-up Information     Follow up With Details Comments Contact Info    Jean Marie Hernández MD In 1 week  [address] [facility]  [phone]      Provider Unknown   Patient not available to ask            Follow-up tests or labs: None    Discharge Condition: Stable    Disposition: home    Time taken to arrange discharge:  35 minutes.     Signed:  Desmond Parrish MD     [facility]  1/26/2017   3:29 PM

## 2020-01-01 ENCOUNTER — TELEPHONE (OUTPATIENT)
Dept: PRIMARY CARE CLINIC | Age: 65
End: 2020-01-01

## 2020-01-01 ENCOUNTER — OFFICE VISIT (OUTPATIENT)
Dept: ENDOCRINOLOGY | Age: 65
End: 2020-01-01
Payer: COMMERCIAL

## 2020-01-01 ENCOUNTER — OFFICE VISIT (OUTPATIENT)
Dept: PRIMARY CARE CLINIC | Age: 65
End: 2020-01-01
Payer: MEDICARE

## 2020-01-01 VITALS
BODY MASS INDEX: 30.05 KG/M2 | DIASTOLIC BLOOD PRESSURE: 74 MMHG | OXYGEN SATURATION: 98 % | WEIGHT: 187 LBS | HEART RATE: 80 BPM | HEIGHT: 66 IN | SYSTOLIC BLOOD PRESSURE: 124 MMHG | TEMPERATURE: 97.8 F

## 2020-01-01 VITALS
HEART RATE: 86 BPM | BODY MASS INDEX: 31.25 KG/M2 | OXYGEN SATURATION: 97 % | HEIGHT: 65 IN | DIASTOLIC BLOOD PRESSURE: 60 MMHG | WEIGHT: 187.6 LBS | SYSTOLIC BLOOD PRESSURE: 114 MMHG | TEMPERATURE: 96.9 F

## 2020-01-01 DIAGNOSIS — E11.9 TYPE 2 DIABETES MELLITUS WITHOUT COMPLICATION, UNSPECIFIED WHETHER LONG TERM INSULIN USE (HCC): ICD-10-CM

## 2020-01-01 DIAGNOSIS — Z76.89 ENCOUNTER TO ESTABLISH CARE: Primary | ICD-10-CM

## 2020-01-01 DIAGNOSIS — G47.30 SLEEP APNEA, UNSPECIFIED TYPE: Chronic | ICD-10-CM

## 2020-01-01 DIAGNOSIS — E11.65 TYPE 2 DIABETES MELLITUS WITH HYPERGLYCEMIA, WITH LONG-TERM CURRENT USE OF INSULIN (HCC): Primary | ICD-10-CM

## 2020-01-01 DIAGNOSIS — R60.0 LOCALIZED EDEMA: Chronic | ICD-10-CM

## 2020-01-01 DIAGNOSIS — B35.1 ONYCHOMYCOSIS OF GREAT TOE: ICD-10-CM

## 2020-01-01 DIAGNOSIS — E11.9 TYPE 2 DIABETES MELLITUS WITHOUT COMPLICATION, UNSPECIFIED WHETHER LONG TERM INSULIN USE (HCC): Primary | ICD-10-CM

## 2020-01-01 DIAGNOSIS — L72.0 INCLUSION CYST: Chronic | ICD-10-CM

## 2020-01-01 DIAGNOSIS — Z79.4 TYPE 2 DIABETES MELLITUS WITH HYPERGLYCEMIA, WITH LONG-TERM CURRENT USE OF INSULIN (HCC): Primary | ICD-10-CM

## 2020-01-01 DIAGNOSIS — F41.8 DEPRESSION WITH ANXIETY: Chronic | ICD-10-CM

## 2020-01-01 DIAGNOSIS — I25.10 CORONARY ARTERY DISEASE INVOLVING NATIVE CORONARY ARTERY OF NATIVE HEART, ANGINA PRESENCE UNSPECIFIED: ICD-10-CM

## 2020-01-01 DIAGNOSIS — E11.59 TYPE 2 DIABETES MELLITUS WITH VASCULAR DISEASE (HCC): ICD-10-CM

## 2020-01-01 DIAGNOSIS — E11.59 TYPE 2 DIABETES MELLITUS WITH VASCULAR DISEASE (HCC): Primary | ICD-10-CM

## 2020-01-01 LAB
GLUCOSE POC: 99 MG/DL
HBA1C MFR BLD HPLC: 6.4 %

## 2020-01-01 PROCEDURE — 82962 GLUCOSE BLOOD TEST: CPT | Performed by: INTERNAL MEDICINE

## 2020-01-01 PROCEDURE — G8752 SYS BP LESS 140: HCPCS | Performed by: FAMILY MEDICINE

## 2020-01-01 PROCEDURE — 99203 OFFICE O/P NEW LOW 30 MIN: CPT | Performed by: FAMILY MEDICINE

## 2020-01-01 PROCEDURE — G8417 CALC BMI ABV UP PARAM F/U: HCPCS | Performed by: FAMILY MEDICINE

## 2020-01-01 PROCEDURE — 83036 HEMOGLOBIN GLYCOSYLATED A1C: CPT | Performed by: INTERNAL MEDICINE

## 2020-01-01 PROCEDURE — G8754 DIAS BP LESS 90: HCPCS | Performed by: FAMILY MEDICINE

## 2020-01-01 PROCEDURE — 99204 OFFICE O/P NEW MOD 45 MIN: CPT | Performed by: INTERNAL MEDICINE

## 2020-01-01 PROCEDURE — G8427 DOCREV CUR MEDS BY ELIG CLIN: HCPCS | Performed by: FAMILY MEDICINE

## 2020-01-01 PROCEDURE — G8510 SCR DEP NEG, NO PLAN REQD: HCPCS | Performed by: FAMILY MEDICINE

## 2020-01-01 PROCEDURE — 3017F COLORECTAL CA SCREEN DOC REV: CPT | Performed by: FAMILY MEDICINE

## 2020-01-01 RX ORDER — HYDRALAZINE HYDROCHLORIDE AND ISOSORBIDE DINITRATE 37.5; 2 MG/1; MG/1
1 TABLET, FILM COATED ORAL
COMMUNITY

## 2020-01-01 RX ORDER — CARVEDILOL PHOSPHATE 80 MG/1
CAPSULE, EXTENDED RELEASE ORAL
COMMUNITY

## 2020-01-01 RX ORDER — FLUOXETINE 10 MG/1
10 CAPSULE ORAL DAILY
Qty: 30 CAP | Refills: 1 | Status: SHIPPED | OUTPATIENT
Start: 2020-01-01 | End: 2021-01-01 | Stop reason: SDUPTHER

## 2020-01-01 RX ORDER — SEMAGLUTIDE 1.34 MG/ML
0.5 INJECTION, SOLUTION SUBCUTANEOUS
COMMUNITY
End: 2020-01-01 | Stop reason: SDUPTHER

## 2020-01-01 RX ORDER — ROSUVASTATIN CALCIUM 40 MG/1
40 TABLET, COATED ORAL
COMMUNITY

## 2020-01-01 RX ORDER — SEMAGLUTIDE 1.34 MG/ML
0.5 INJECTION, SOLUTION SUBCUTANEOUS
Qty: 1 BOX | Refills: 0 | Status: SHIPPED | OUTPATIENT
Start: 2020-01-01 | End: 2020-01-01 | Stop reason: SDUPTHER

## 2020-01-01 RX ORDER — INSULIN ASPART 100 [IU]/ML
INJECTION, SOLUTION INTRAVENOUS; SUBCUTANEOUS
Qty: 3 VIAL | Refills: 3 | Status: SHIPPED | OUTPATIENT
Start: 2020-01-01 | End: 2021-01-01 | Stop reason: SDUPTHER

## 2020-01-01 RX ORDER — BUMETANIDE 2 MG/1
2 TABLET ORAL DAILY
COMMUNITY

## 2020-01-01 RX ORDER — SEMAGLUTIDE 1.34 MG/ML
0.5 INJECTION, SOLUTION SUBCUTANEOUS
Qty: 1 BOX | Refills: 3 | Status: SHIPPED | OUTPATIENT
Start: 2020-01-01 | End: 2021-01-01

## 2020-01-01 RX ORDER — GABAPENTIN 400 MG/1
CAPSULE ORAL
COMMUNITY
Start: 2020-01-01

## 2020-01-01 RX ORDER — POTASSIUM CHLORIDE 1.5 G/1.77G
20 POWDER, FOR SOLUTION ORAL 2 TIMES DAILY
COMMUNITY

## 2020-01-01 RX ORDER — INSULIN ASPART 100 [IU]/ML
INJECTION, SOLUTION INTRAVENOUS; SUBCUTANEOUS
Qty: 14 VIAL | Refills: 0 | Status: SHIPPED | OUTPATIENT
Start: 2020-01-01 | End: 2020-01-01 | Stop reason: SDUPTHER

## 2020-11-03 NOTE — PROGRESS NOTES
HISTORY OF PRESENT ILLNESS Here to establish care. Depression? Armida :  Been on meds in the past.  Prozac was doing well and so decided to come off it. Recent issues with staying home a lot bc of covid. Feels low and misses being independent and being out of the house, seeing friends and relatives. anxiety - worse since light decrease with Nicolle/fall. .  Has depression as well. Been many years was on prozac .  is battling cancer.  - prostate . Diabetes: 
  Dr Donny Beckman was endo - has insulin pump. She will see her New Endo next week now that he has retired. There is no medication concerns. Denies any neurological symptoms , and cuts heal well. Checks feet at home. Hypertension: 
Cardiology  -  Dr Yen Barron. recent defib 4/2020. He did labs. Hyperlipidemia:   
Lab Results Component Value Date/Time Cholesterol, total 255 (H) 11/01/2011 05:50 AM  
 HDL Cholesterol 44 11/01/2011 05:50 AM  
 LDL, calculated 146 (H) 11/01/2011 05:50 AM  
 VLDL, calculated 65 11/01/2011 05:50 AM  
 Triglyceride 325 (H) 11/01/2011 05:50 AM  
 CHOL/HDL Ratio 5.8 (H) 11/01/2011 05:50 AM  
  
 
 
 
 due for Mammogram.   Due for colonscopy- 2014. Sleep apnea not on cpap bc of failed to tolerate. Leg  Swelling , X  1 year, inceased in past few  montsh . No pain. Left upper back lesion  - X years. Irritates and itch a lot. Has not  seen derm. rheumatology- was seeing dr Tori Howell. Seeing dr Sindy Engle now. Have rhuematoid arthritis and has orencia infusion. Andrew Flowers is a 63 y.o. female. Past Medical History:  
Diagnosis Date  Angina pectoris with normal coronary arteriogram ([facility]  Anxiety  CAD (coronary artery disease) \"small vessel\" disease by cath at [address] Nov 2010 Raya Rosa)  DJD (degenerative joint disease) of hip Dr Tyler Cline - recent steroid injection  DM (diabetes mellitus) ([facility]  Hypertension  Hyperthyroidism  Nonischemic cardiomyopathy ([facility]  SOFÍA (obstructive sleep apnea)   
 pulm dr Carvajal Asp  Sleep apnea   
 not  on machine  Systolic heart failure secondary to idiopathic cardiomyopathy ([facility] Social History Tobacco Use  Smoking status: Never Smoker  Smokeless tobacco: Never Used Substance Use Topics  Alcohol use: Yes Comment: rarely  Drug use: No  
 
 
Family History Problem Relation Age of Onset  Heart Surgery Father  Heart Attack Father  Cancer Mother   
     leukemia Review of Systems Constitutional: Negative. Negative for chills, fever, malaise/fatigue and weight loss. HENT: Negative for congestion. Eyes: Negative for blurred vision and pain. Respiratory: Negative for cough, sputum production, shortness of breath and wheezing. Cardiovascular: Positive for leg swelling. Negative for chest pain, palpitations, orthopnea, claudication and PND. Gastrointestinal: Negative for abdominal pain and nausea. Genitourinary: Negative for dysuria and frequency. Musculoskeletal: Negative for falls. Skin: Positive for itching. Negative for rash. Neurological: Negative for dizziness, tremors, speech change, focal weakness, loss of consciousness, weakness and headaches. Endo/Heme/Allergies: Negative for polydipsia. Psychiatric/Behavioral: Positive for depression. Negative for hallucinations, memory loss, substance abuse and suicidal ideas. The patient is nervous/anxious. The patient does not have insomnia. Visit Vitals /60 (BP 1 Location: Right arm, BP Patient Position: At rest) Pulse 86 Temp 96.9 °F (36.1 °C) (Temporal) Ht 5' 5\" (1.651 m) Wt 187 lb 9.6 oz (85.1 kg) SpO2 97% BMI 31.22 kg/m² Physical Exam 
Vitals signs and nursing note reviewed. Constitutional:   
   General: She is not in acute distress. Appearance: Normal appearance. She is obese. She is not ill-appearing.   
HENT:  
 Head: Normocephalic and atraumatic. Right Ear: Tympanic membrane and ear canal normal.  
   Left Ear: Tympanic membrane and ear canal normal.  
   Nose: Rhinorrhea present. Mouth/Throat:  
   Mouth: Mucous membranes are moist.  
   Pharynx: No oropharyngeal exudate or posterior oropharyngeal erythema. Eyes:  
   General: No scleral icterus. Right eye: No discharge. Left eye: No discharge. Extraocular Movements: Extraocular movements intact. Conjunctiva/sclera: Conjunctivae normal.  
   Pupils: Pupils are equal, round, and reactive to light. Neck: Musculoskeletal: No muscular tenderness. Cardiovascular:  
   Rate and Rhythm: Normal rate and regular rhythm. Pulses: Normal pulses. Heart sounds: Normal heart sounds. Pulmonary:  
   Effort: Pulmonary effort is normal.  
   Breath sounds: Normal breath sounds. Abdominal:  
   General: Abdomen is flat. There is no distension. Palpations: Abdomen is soft. There is no mass. Tenderness: There is no abdominal tenderness. There is no right CVA tenderness or left CVA tenderness. Musculoskeletal:  
   Right lower leg: No edema. Left lower leg: No edema. Lymphadenopathy:  
   Cervical: No cervical adenopathy. Skin: 
   General: Skin is warm and dry. Capillary Refill: Capillary refill takes less than 2 seconds. Findings: Lesion (inclusion cyst upper back, scarred down. ) present. Neurological:  
   General: No focal deficit present. Mental Status: She is alert and oriented to person, place, and time. Deep Tendon Reflexes: Reflexes normal.  
Psychiatric:     
   Mood and Affect: Mood normal.     
   Behavior: Behavior normal.     
   Thought Content: Thought content normal.     
   Judgment: Judgment normal.  
 
 
 
 
ASSESSMENT and PLAN Diagnoses and all orders for this visit: 1. Encounter to establish care 2. Depression with anxiety Comments: Restart Prozac, with lowest dose. ,  side effects discussed. Discontinue if SI, HI, nightmares, altered behavior. 3. Sleep apnea, unspecified type Comments: 
Discussed that there are other options for sleep apnea now, for example dental device, surgical intervention 4. Inclusion cyst 
Comments: 
Seems like it is scar noninflamed 5. Localized edema Comments: 
Mild, intermittent. Most likely due to varicose veins. Discussed compression stockings and elevation 6. Coronary artery disease involving native coronary artery of native heart, angina presence unspecified Other orders -     FLUoxetine (PROzac) 10 mg capsule; Take 1 Cap by mouth daily. Indications: anxiousness associated with depression Orders Placed This Encounter  DISCONTD: semaglutide (Ozempic) 0.25 mg/0.2 mL (2 mg/1.5 mL) sub-q pen  sacubitril/valsartan (ENTRESTO PO)  FLUoxetine (PROzac) 10 mg capsule Follow-up and Dispositions · Return in about 4 weeks (around 12/1/2020) for Medciation change follow up.

## 2020-11-23 NOTE — TELEPHONE ENCOUNTER
Patient called today and stated that she needs a new script for her Novolog she can no longer get it from [address] said they will not transfer the prescription from them they need a new one and she does not have a new Endo doctor to get the script from yet please call her at  [phone] once you get an answer from Dr. Jem Godoy.

## 2020-11-24 NOTE — TELEPHONE ENCOUNTER
Pt uses an insulin pump. The sig is written as up to 150 units in 24 hours.  Pt states she doesn't always use 150 units and she typically goes through 4 vials a month

## 2020-12-30 NOTE — LETTER
12/30/2020 Patient: Lui Roblero YOB: 1955 Date of Visit: 12/30/2020 Nelson Harding MD 
[place] [id number] [place] [address] [address] [employer] Dear Nelson Harding MD, Thank you for referring Ms. Lyndsey Bolton to [address] [place] [address] for evaluation. My notes for this consultation are attached. If you have questions, please do not hesitate to call me. I look forward to following your patient along with you. Sincerely, Andrea Bearden MD

## 2020-12-30 NOTE — PROGRESS NOTES
History and Physical 
 
Patient: Dianne Amaya MRN: [id number]  SSN: [id number] YOB: 1955  Age: 63 y.o. Sex: female Subjective:  
  
Dianne Amaya is a 63 y.o. female past medical history of hypertension, hyperlipidemia is here to establish care for type 2 diabetes mellitus. She is in primary care of Dr. Shereen Singh. Patient was previously seeing endocrinologist Dr. Marshall King, who retired. So patient is here to establish care with me. She was diagnosed with diabetes 16 years back. She has been using insulin pump since past 5 years or so. Current pump was started in September 2020 and that is when the last time her settings were changed. She also takes Ozempic 0.5 mg weekly and she tolerates it well. She uses her chart for carb counting. Compliant with medications and she boluses for all carbohydrate intake. Sometimes if she skips a meal she has low blood glucose, it drops to the 60s. Her pump is [employer]ronic 670 G but she does not wear the continuous glucose monitor that comes with it because it was giving her soreness and also it was alarming too much for calibration. Glucometer reading: Did not bring glucometer today · Diagnosis: 16 years · Current treatment: insulin pump (since 4-5 years, current pump 9-2020) and Ozempic 0.5 mg weekly (2-2020) · Past treatment: victoza (switched to [address]) · Glucose checks: 3-4 times a day · Hyperglycemia: no 
· Hypoglycemia: sometimes if she skips a meal 
· Meals per day: 3, counting carbs, refers to a chart · Exercise: no 
· DM related hospitalizations: no 
 
Complications of DM: 
· CAD: yes small artery disease, cardiomyopathy s/p defibrillator Dr. Toro Kinds · CVA: no 
· PVD: no 
· Amputations: no  
· Retinopathy: no; last exam was , next apt in 1-2021 · Gastropathy: no 
· Nephropathy: no 
· Neuropathy: no 
 
Medications: 
· Statin: Crestor 40 mg 
· ACE-I: valsartan (Enteresto) · ASA: yes · Diabetes education: yes Pump Pump : [id number] 588 G Insulin type Humalog Insulin Time: 4 hours Insulin Pump Settings Basal rate: 
12 AM12 PM: 1.2 units/h Carb ratio: 1: 8 Sensitivity factor: 30 BG Targets: 883522 Past Medical History:  
Diagnosis Date  Angina pectoris with normal coronary arteriogram ([facility]  Anxiety  CAD (coronary artery disease) \"small vessel\" disease by cath at [facility] Nov 2010 Ramon Bishop)  DJD (degenerative joint disease) of hip Dr Evie Negrete - recent steroid injection  DM (diabetes mellitus) ([facility]  Hypertension  Hyperthyroidism  Nonischemic cardiomyopathy ([facility]  SOFÍA (obstructive sleep apnea)   
 pulm dr Gerson Garcia  Systolic [facility] Past Surgical History:  
Procedure Laterality Date  CARDIAC CATHETERIZATION  12/9/10 LVEDP 21 EF 55%, no epicardial disease. small vessel disease  CARDIAC CATHETERIZATION  10/31/11 LVEDP 23, mildly dilated LV with LVH, EF 35%, global HK, normal cors.  ECHO 2D ADULT  4/7/05  
 mild LVH. EF 55%  ECHO 2D ADULT  1/27/12 EF 35-40%, septal dysynchrony  HX COLONOSCOPY  2014  
  [facility]  
 HX GYN Hysterectomy  HX HIP REPLACEMENT    
 left  HX IMPLANTABLE CARDIOVERTER DEFIBRILLATOR  STRESS TEST LEXISCAN  12/2/10  
 no ischemia. EF 56% Family History Problem Relation Age of Onset  Heart Surgery Father  Heart Attack Father  Cancer Mother   
     leukemia Social History Tobacco Use  Smoking status: Never Smoker  Smokeless tobacco: Never Used Substance Use Topics  Alcohol use: Yes Comment: rarely Prior to Admission medications Medication Sig Start Date End Date Taking? Authorizing Provider  
bumetanide (BUMEX) 2 mg tablet Take 2 mg by mouth daily.    Yes Provider, Historical  
gabapentin (NEURONTIN) 400 mg capsule  10/13/20  Yes Provider, Historical potassium chloride (KLOR-CON) 20 mEq pack Take 20 mEq by mouth two (2) times a day. Yes Provider, Historical  
isosorbide-hydrALAZINE (BiDiL) 20-37.5 mg per tablet Take 1 Tab by mouth. Yes Provider, Historical  
rosuvastatin (Crestor) 40 mg tablet Take 40 mg by mouth nightly. Yes Provider, Historical  
carvedilol (Coreg CR) 80 mg CR capsule Take  by mouth daily (with breakfast). Yes Provider, Historical  
semaglutide (Ozempic) 0.25 mg/0.2 mL (2 mg/1.5 mL) sub-q pen 0.5 mg by SubCUTAneous route every seven (7) days for 28 days. 12/30/20 1/27/21 Yes Socorro Boykin MD  
insulin aspart U-100 (NOVOLOG) 100 unit/mL injection Use as directed in insulin pump, max daily dose 70 units 12/30/20  Yes Socorro Boykin MD  
sacubitril/valsartan (ENTRESTO PO) Take  by mouth. Yes Provider, Historical  
FLUoxetine (PROzac) 10 mg capsule Take 1 Cap by mouth daily. Indications: anxiousness associated with depression 11/3/20  Yes Jesu Barron MD  
ergocalciferol (ERGOCALCIFEROL) 50,000 unit capsule Take 50,000 Units by mouth every Tuesday. Yes Provider, Historical  
 insulin pump (PATIENT SUPPLIED) misc by SubCUTAneous route as needed. Yes Provider, Historical  
albuterol (PROAIR HFA) 90 mcg/actuation inhaler Take 2 Puffs by inhalation every six (6) hours as needed for Wheezing. Yes Provider, Historical  
metOLazone (ZAROXOLYN) 5 mg tablet Take 5 mg by mouth daily as needed (for weight increase of 2 lbs in 24 hrs for Heart Failure). Yes Provider, Historical  
ranolazine ER (RANEXA) 500 mg SR tablet Take 1 Tab by mouth two (2) times a day. 11/8/11  Yes CONNER Koch  
aspirin 81 mg chewable tablet Take 1 Tab by mouth daily. 11/1/11  Yes CONNER Koch  
nitroglycerin (NITROSTAT) 0.4 mg SL tablet 1 Tab by SubLINGual route every five (5) minutes as needed for Chest Pain. 11/1/11  Yes CONNER Koch  
omega-3 fatty acids-vitamin e (FISH OIL) 1,000 mg Cap Take 2 Caps by mouth two (2) times a week.  11/1/11 Yes CONNER Atkinson Allergies Allergen Reactions  Pcn [Penicillins] Swelling  Sulfa (Sulfonamide Antibiotics) Itching Review of Systems: ROS A comprehensive review of systems was preformed and it is negative except mentioned in HPI Objective:  
 
Vitals:  
 12/30/20 1109 BP: 124/74 Pulse: 80 Temp: 97.8 °F (36.6 °C) TempSrc: Temporal  
SpO2: 98% Weight: 187 lb (84.8 kg) Height: 5' 5.5\" (1.664 m) Physical Exam: 
 
Physical Exam 
Vitals signs and nursing note reviewed. Constitutional:   
   Appearance: Normal appearance. HENT:  
   Head: Normocephalic and atraumatic. Eyes:  
   Extraocular Movements: Extraocular movements intact. Pupils: Pupils are equal, round, and reactive to light. Neck: Musculoskeletal: Neck supple. Cardiovascular:  
   Rate and Rhythm: Normal rate and regular rhythm. Pulmonary:  
   Effort: Pulmonary effort is normal.  
   Breath sounds: Normal breath sounds. Abdominal:  
   General: Bowel sounds are normal.  
   Palpations: Abdomen is soft. Musculoskeletal: Normal range of motion. General: No swelling. Skin: 
   General: Skin is warm and dry. Neurological:  
   General: No focal deficit present. Mental Status: She is alert and oriented to person, place, and time. Psychiatric:     
   Mood and Affect: Mood normal.     
   Behavior: Behavior normal.  
 
 
diabetic foot exam: 
Bilateral diabetic foot exam was performed today. Dorsalis pedis pulses 2+ bilaterally. Monofilament sensation normal bilaterally. No ulcers or skin breakdown. Bilateral great toe onychomycosis present Labs and Imaging: 
 
Last 3 Recorded Weights in this Encounter 12/30/20 1109 Weight: 187 lb (84.8 kg) Lab Results Component Value Date/Time Hemoglobin A1c 7.9 (H) 01/25/2017 10:18 AM  
  
 
Assessment:  
 
Patient Active Problem List  
Diagnosis Code  CAD (coronary artery disease) I25.10  
 HTN (hypertension) I10  
 Nonischemic cardiomyopathy ([facility] I42.8  Dyslipidemia E78.5  DM type [facility] E11.59  Sleep apnea G47.30  Hyperthyroidism E05.90  
 Osteoarthritis of hip M16.9  Chest pain, atypical R07.89  Dyspnea R06.00  Systolic heart failure secondary to idiopathic cardiomyopathy (HCC) I50.20, I42.9  Elevated LFTs R79.89  Systolic CHF, acute on chronic (HCC) I50.23 Plan:  
 
Type 2 diabetes mellitus, well controlled: I reviewed progress note and labs from the referring provider's office. Hemoglobin A1c is 6.4% today. Fingerstick blood glucose is 99 mg/dL in my office today. Up to date with diabetes related annual labs: Except TSH, urine microalbumin/creatinine ratio and lipid profile Up to date with diabetic eye exam: Due, has appointment in January 2021 Plan: 
Given patient's comorbidities, avoiding hypoglycemia is important. Patient's glucose goes to the 60s if she skips a meal.  This means that her basal rate needs to go down slightly. Decrease basal rate from 1.2 units/h to 1 unit/h. I will continue current carb ratio, sensitivity factor. I will see her back in 3 months. Essential hypertension: 
Blood pressure well controlled on current medications. Check urine microalbumin/creatinine ratio. Mixed hyperlipidemia: 
Currently on Crestor 40 mg daily at bedtime. Check lipid profile. Coronary artery disease, cardiomyopathy status post defibrillator Follows with cardiologist Dr. Cortez Gutierrez. Orders Placed This Encounter  LIPID PANEL  
 TSH RFX ON ABNORMAL TO FREE T4  
 MICROALBUMIN, UR, RAND W/ MICROALB/CREAT RATIO  REFERRAL TO PODIATRY Referral Priority:   Routine Referral Type:   Consultation Referral Reason:   Specialty Services Required Referred to Provider:   Abdiel Bauer DPM  
  Requested Specialty:   Podiatry   Number of Visits Requested:   1  
 semaglutide (Ozempic) 0.25 mg/0.2 mL (2 mg/1.5 mL) sub-q pen Si.5 mg by SubCUTAneous route every seven (7) days for 28 days. Dispense:  1 Box Refill:  3  
 insulin aspart U-100 (NOVOLOG) 100 unit/mL injection Sig: Use as directed in insulin pump, max daily dose 70 units Dispense:  3 Vial  
  Refill:  3 Signed By: Vicente Argueta MD   
 2020 Return to clinic 3 months

## 2020-12-31 PROBLEM — L72.0 INCLUSION CYST: Chronic | Status: ACTIVE | Noted: 2020-01-01

## 2020-12-31 PROBLEM — F41.8 DEPRESSION WITH ANXIETY: Chronic | Status: ACTIVE | Noted: 2020-01-01

## 2020-12-31 PROBLEM — R60.0 LOCALIZED EDEMA: Chronic | Status: ACTIVE | Noted: 2020-01-01

## 2021-01-01 ENCOUNTER — OFFICE VISIT (OUTPATIENT)
Dept: ENDOCRINOLOGY | Age: 66
End: 2021-01-01
Payer: COMMERCIAL

## 2021-01-01 ENCOUNTER — OFFICE VISIT (OUTPATIENT)
Dept: PODIATRY | Age: 66
End: 2021-01-01
Payer: COMMERCIAL

## 2021-01-01 ENCOUNTER — OFFICE VISIT (OUTPATIENT)
Dept: PRIMARY CARE CLINIC | Age: 66
End: 2021-01-01
Payer: COMMERCIAL

## 2021-01-01 VITALS
SYSTOLIC BLOOD PRESSURE: 96 MMHG | TEMPERATURE: 98.3 F | WEIGHT: 181.8 LBS | DIASTOLIC BLOOD PRESSURE: 58 MMHG | OXYGEN SATURATION: 97 % | BODY MASS INDEX: 30.29 KG/M2 | HEART RATE: 78 BPM | HEIGHT: 65 IN

## 2021-01-01 VITALS
OXYGEN SATURATION: 99 % | HEIGHT: 65 IN | HEART RATE: 79 BPM | WEIGHT: 184.1 LBS | SYSTOLIC BLOOD PRESSURE: 112 MMHG | DIASTOLIC BLOOD PRESSURE: 68 MMHG | TEMPERATURE: 98 F | BODY MASS INDEX: 30.67 KG/M2

## 2021-01-01 VITALS
HEIGHT: 65 IN | DIASTOLIC BLOOD PRESSURE: 72 MMHG | HEART RATE: 79 BPM | OXYGEN SATURATION: 99 % | WEIGHT: 184.2 LBS | BODY MASS INDEX: 30.69 KG/M2 | SYSTOLIC BLOOD PRESSURE: 141 MMHG | TEMPERATURE: 95.7 F

## 2021-01-01 DIAGNOSIS — J45.41 MODERATE PERSISTENT ASTHMA WITH ACUTE EXACERBATION: Primary | Chronic | ICD-10-CM

## 2021-01-01 DIAGNOSIS — I50.20 SYSTOLIC HEART FAILURE SECONDARY TO IDIOPATHIC CARDIOMYOPATHY (HCC): Chronic | ICD-10-CM

## 2021-01-01 DIAGNOSIS — I42.9 SYSTOLIC HEART FAILURE SECONDARY TO IDIOPATHIC CARDIOMYOPATHY (HCC): Chronic | ICD-10-CM

## 2021-01-01 DIAGNOSIS — F41.8 DEPRESSION WITH ANXIETY: Chronic | ICD-10-CM

## 2021-01-01 DIAGNOSIS — Z79.4 TYPE 2 DIABETES MELLITUS WITH HYPERGLYCEMIA, WITH LONG-TERM CURRENT USE OF INSULIN (HCC): Primary | ICD-10-CM

## 2021-01-01 DIAGNOSIS — E78.2 MIXED HYPERLIPIDEMIA: ICD-10-CM

## 2021-01-01 DIAGNOSIS — B35.1 ONYCHOMYCOSIS: ICD-10-CM

## 2021-01-01 DIAGNOSIS — E11.59 DM TYPE 2 CAUSING VASCULAR DISEASE (HCC): Primary | Chronic | ICD-10-CM

## 2021-01-01 DIAGNOSIS — E11.59 TYPE 2 DIABETES MELLITUS WITH VASCULAR DISEASE (HCC): ICD-10-CM

## 2021-01-01 DIAGNOSIS — E11.65 TYPE 2 DIABETES MELLITUS WITH HYPERGLYCEMIA, WITH LONG-TERM CURRENT USE OF INSULIN (HCC): Primary | ICD-10-CM

## 2021-01-01 DIAGNOSIS — M16.9 OSTEOARTHRITIS OF HIP, UNSPECIFIED LATERALITY, UNSPECIFIED OSTEOARTHRITIS TYPE: ICD-10-CM

## 2021-01-01 LAB
ALBUMIN/CREAT UR: 90 MG/G CREAT (ref 0–29)
CHOLEST SERPL-MCNC: 248 MG/DL (ref 100–199)
CREAT UR-MCNC: 242.2 MG/DL
GLUCOSE POC: 198 MG/DL
HBA1C MFR BLD HPLC: 6.1 %
HDLC SERPL-MCNC: 49 MG/DL
LDLC SERPL CALC-MCNC: 176 MG/DL (ref 0–99)
MICROALBUMIN UR-MCNC: 218.2 UG/ML
SPECIMEN STATUS REPORT, ROLRST: NORMAL
TRIGL SERPL-MCNC: 129 MG/DL (ref 0–149)
TSH SERPL DL<=0.005 MIU/L-ACNC: 1.16 UIU/ML (ref 0.45–4.5)
VLDLC SERPL CALC-MCNC: 23 MG/DL (ref 5–40)

## 2021-01-01 PROCEDURE — G8417 CALC BMI ABV UP PARAM F/U: HCPCS | Performed by: PODIATRIST

## 2021-01-01 PROCEDURE — 99214 OFFICE O/P EST MOD 30 MIN: CPT | Performed by: INTERNAL MEDICINE

## 2021-01-01 PROCEDURE — G9717 DOC PT DX DEP/BP F/U NT REQ: HCPCS | Performed by: PODIATRIST

## 2021-01-01 PROCEDURE — G8427 DOCREV CUR MEDS BY ELIG CLIN: HCPCS | Performed by: PODIATRIST

## 2021-01-01 PROCEDURE — 99203 OFFICE O/P NEW LOW 30 MIN: CPT | Performed by: PODIATRIST

## 2021-01-01 PROCEDURE — 99214 OFFICE O/P EST MOD 30 MIN: CPT | Performed by: FAMILY MEDICINE

## 2021-01-01 PROCEDURE — 3046F HEMOGLOBIN A1C LEVEL >9.0%: CPT | Performed by: PODIATRIST

## 2021-01-01 PROCEDURE — 83036 HEMOGLOBIN GLYCOSYLATED A1C: CPT | Performed by: INTERNAL MEDICINE

## 2021-01-01 PROCEDURE — G8753 SYS BP > OR = 140: HCPCS | Performed by: PODIATRIST

## 2021-01-01 PROCEDURE — 2022F DILAT RTA XM EVC RTNOPTHY: CPT | Performed by: PODIATRIST

## 2021-01-01 PROCEDURE — 3017F COLORECTAL CA SCREEN DOC REV: CPT | Performed by: PODIATRIST

## 2021-01-01 PROCEDURE — 11755 BIOPSY NAIL UNIT: CPT | Performed by: PODIATRIST

## 2021-01-01 PROCEDURE — 82962 GLUCOSE BLOOD TEST: CPT | Performed by: INTERNAL MEDICINE

## 2021-01-01 PROCEDURE — G8754 DIAS BP LESS 90: HCPCS | Performed by: PODIATRIST

## 2021-01-01 RX ORDER — CODEINE PHOSPHATE AND GUAIFENESIN 10; 100 MG/5ML; MG/5ML
5 SOLUTION ORAL
Qty: 236 ML | Refills: 0 | Status: SHIPPED | OUTPATIENT
Start: 2021-01-01 | End: 2021-01-01

## 2021-01-01 RX ORDER — EZETIMIBE 10 MG/1
10 TABLET ORAL DAILY
Qty: 30 TAB | Refills: 3 | Status: SHIPPED | OUTPATIENT
Start: 2021-01-01 | End: 2021-01-01

## 2021-01-01 RX ORDER — TORSEMIDE 20 MG/1
TABLET ORAL
COMMUNITY
Start: 2021-01-01

## 2021-01-01 RX ORDER — INSULIN ASPART 100 [IU]/ML
INJECTION, SOLUTION INTRAVENOUS; SUBCUTANEOUS
Qty: 3 VIAL | Refills: 3 | Status: SHIPPED | OUTPATIENT
Start: 2021-01-01

## 2021-01-01 RX ORDER — CODEINE PHOSPHATE AND GUAIFENESIN 10; 100 MG/5ML; MG/5ML
SOLUTION ORAL
COMMUNITY
Start: 2021-01-01 | End: 2021-01-01 | Stop reason: SDUPTHER

## 2021-01-01 RX ORDER — BRIMONIDINE TARTRATE 2 MG/ML
SOLUTION/ DROPS OPHTHALMIC
COMMUNITY
Start: 2021-01-01

## 2021-01-01 RX ORDER — FLUOXETINE HYDROCHLORIDE 20 MG/1
20 CAPSULE ORAL DAILY
Qty: 90 CAP | Refills: 1 | Status: SHIPPED | OUTPATIENT
Start: 2021-01-01

## 2021-01-01 RX ORDER — SEMAGLUTIDE 1.34 MG/ML
0.5 INJECTION, SOLUTION SUBCUTANEOUS
Qty: 1 BOX | Refills: 3 | Status: SHIPPED | OUTPATIENT
Start: 2021-01-01 | End: 2021-01-01

## 2021-01-01 RX ORDER — FLUTICASONE PROPIONATE 220 UG/1
1 AEROSOL, METERED RESPIRATORY (INHALATION) 2 TIMES DAILY
Qty: 1 INHALER | Refills: 2 | Status: SHIPPED | OUTPATIENT
Start: 2021-01-01

## 2021-01-01 RX ORDER — MONTELUKAST SODIUM 10 MG/1
10 TABLET ORAL DAILY
Qty: 90 TAB | Refills: 1 | Status: SHIPPED | OUTPATIENT
Start: 2021-01-01

## 2021-01-26 PROBLEM — B35.1 ONYCHOMYCOSIS: Status: ACTIVE | Noted: 2021-01-01

## 2021-01-31 NOTE — PROGRESS NOTES
[facility] Subjective: 
 
  
 
Patient is a 63 y.o. female who is being seen as a new pt for a diabetic pedal exam and for possible toenail fungus. Pt states her DM has been out of control, but she is working with her endocrinologist to bring her A1c down below 8%. She denies any recent LE trauma. Denies any LE pain. States her toenails are thick/discolorded but denies ever having testing performed to confirm a diagnosis. She denies any systemic signs of infx. She denies any other LE complaints Past Medical History:  
Diagnosis Date  Angina pectoris with normal coronary arteriogram ([facility]  Anxiety  CAD (coronary artery disease) \"small vessel\" disease by cath at [facility] Nov 2010 [place])  DJD (degenerative joint disease) of hip Dr Hercules Gitelman - recent steroid injection  DM (diabetes mellitus) ([facility]  Hypertension  Hyperthyroidism  Nonischemic cardiomyopathy ([facility] [facility]  SOFÍA (obstructive sleep apnea)   
 pulm dr Jenifer Puente  Sleep apnea   
 not  on machine  Systolic heart failure secondary to idiopathic cardiomyopathy ([facility] Past Surgical History:  
Procedure Laterality Date  CARDIAC CATHETERIZATION  12/9/10 LVEDP 21 EF 55%, no epicardial disease. small vessel disease  CARDIAC CATHETERIZATION  10/31/11 LVEDP 23, mildly dilated LV with LVH, EF 35%, global HK, normal cors.  ECHO 2D ADULT  4/7/05  
 mild LVH. EF 55%  ECHO 2D ADULT  1/27/12 EF 35-40%, septal dysynchrony  HX COLONOSCOPY  2014  
  [facility]  
 HX GYN Hysterectomy  HX HIP REPLACEMENT    
 left  HX IMPLANTABLE CARDIOVERTER DEFIBRILLATOR  STRESS TEST LEXISCAN  12/2/10  
 no ischemia. EF 56% Family History Problem Relation Age of Onset  Heart Surgery Father  Heart Attack Father  Cancer Mother   
     leukemia Social History Tobacco Use  Smoking status: Never Smoker  Smokeless tobacco: Never Used Substance Use Topics  Alcohol use: Yes Comment: rarely Allergies Allergen Reactions  Pcn [Penicillins] Swelling  Sulfa (Sulfonamide Antibiotics) Itching Prior to Admission medications Medication Sig Start Date End Date Taking? Authorizing Provider  
bumetanide (BUMEX) 2 mg tablet Take 2 mg by mouth daily. Provider, Historical  
gabapentin (NEURONTIN) 400 mg capsule  10/13/20   Provider, Historical  
potassium chloride (KLOR-CON) 20 mEq pack Take 20 mEq by mouth two (2) times a day. Provider, Historical  
isosorbide-hydrALAZINE (BiDiL) 20-37.5 mg per tablet Take 1 Tab by mouth. Provider, Historical  
rosuvastatin (Crestor) 40 mg tablet Take 40 mg by mouth nightly. Provider, Historical  
carvedilol (Coreg CR) 80 mg CR capsule Take  by mouth daily (with breakfast). Provider, Historical  
insulin aspart U-100 (NOVOLOG) 100 unit/mL injection Use as directed in insulin pump, max daily dose 70 units 12/30/20   Danniel Leventhal, MD  
sacubitril/valsartan (ENTRESTO PO) Take  by mouth. Provider, Historical  
FLUoxetine (PROzac) 10 mg capsule Take 1 Cap by mouth daily. Indications: anxiousness associated with depression 11/3/20   Muriel Lee MD  
ergocalciferol (ERGOCALCIFEROL) 50,000 unit capsule Take 50,000 Units by mouth every Tuesday. Provider, Historical  
 insulin pump (PATIENT SUPPLIED) misc by SubCUTAneous route as needed. Provider, Historical  
albuterol (PROAIR HFA) 90 mcg/actuation inhaler Take 2 Puffs by inhalation every six (6) hours as needed for Wheezing. Provider, Historical  
metOLazone (ZAROXOLYN) 5 mg tablet Take 5 mg by mouth daily as needed (for weight increase of 2 lbs in 24 hrs for Heart Failure). Provider, Historical  
ranolazine ER (RANEXA) 500 mg SR tablet Take 1 Tab by mouth two (2) times a day. 11/8/11   CONNER Moses  
aspirin 81 mg chewable tablet Take 1 Tab by mouth daily.  11/1/11   CONNER Moses  
 nitroglycerin (NITROSTAT) 0.4 mg SL tablet 1 Tab by SubLINGual route every five (5) minutes as needed for Chest Pain. 11/1/11   Ramakrishna Justino, ACNP  
omega-3 fatty acids-vitamin e (FISH OIL) 1,000 mg Cap Take 2 Caps by mouth two (2) times a week. 11/1/11   Ramakrishna Justino ACNP Review of Systems Constitutional: Negative. HENT: Negative. Eyes: Negative. Respiratory: Negative. Cardiovascular: Positive for leg swelling. Gastrointestinal: Negative. Endocrine: Negative. Genitourinary: Negative. Musculoskeletal: Negative. Skin: Negative. Allergic/Immunologic: Positive for immunocompromised state. Neurological: Negative. Hematological: Negative. Psychiatric/Behavioral: Negative. All other systems reviewed and are negative. Objective:  
 
Visit Vitals BP (!) 141/72 (BP 1 Location: Right arm, BP Patient Position: Sitting) Pulse 79 Temp (!) 95.7 °F (35.4 °C) (Temporal) Ht 5' 5\" (1.651 m) Wt 184 lb 3.2 oz (83.6 kg) SpO2 99% BMI 30.65 kg/m² Physical Exam 
Vitals signs reviewed. Constitutional:   
   Appearance: She is obese. Cardiovascular:  
   Pulses:     
     Dorsalis pedis pulses are 2+ on the right side and 2+ on the left side. Posterior tibial pulses are 2+ on the right side and 2+ on the left side. Pulmonary:  
   Effort: Pulmonary effort is normal.  
Musculoskeletal:  
   Right lower leg: Edema present. Left lower leg: Edema present. Right foot: Normal range of motion. No deformity or bunion. Left foot: Normal range of motion. No deformity or bunion. Feet:  
   Right foot:  
   Protective Sensation: 10 sites tested. 10 sites sensed. Skin integrity: Skin integrity normal.  
   Toenail Condition: Right toenails are abnormally thick. Fungal disease present. Left foot:  
   Protective Sensation: 10 sites tested. 10 sites sensed.   
   Skin integrity: Skin integrity normal.  
 Toenail Condition: Left toenails are abnormally thick. Fungal disease present. Lymphadenopathy:  
   Lower Body: No right inguinal adenopathy. No left inguinal adenopathy. Skin: 
   General: Skin is warm. Capillary Refill: Capillary refill takes 2 to 3 seconds. Neurological:  
   Mental Status: She is alert and oriented to person, place, and time. Psychiatric:     
   Mood and Affect: Mood and affect normal.     
   Behavior: Behavior is cooperative. Data Review: No results found for this or any previous visit (from the past 24 hour(s)). Impression: ICD-10-CM ICD-9-CM 1. DM type 2 causing vascular disease ([facility]  E11.59 250.70   
  443.81   
2. Onychomycosis  B35.1 110.1 BIOPSY, NAIL UNIT Recommendation:  
 
Patient seen and evaluated in the office Discussed and educated patient regarding their current medical condition. Instructed patient to monitor their feet daily, be compliant with all medications and wear supportive shoe gear. It was determined that a toenail plate biopsy would be taken and sent to pathology for analysis. This was performed to the right hallux with a nail nipper without incident. Pt tolerated well with no dressing needed. Instructed pt that when pathology results return, will discuss tx options in more depth

## 2021-02-11 NOTE — PROGRESS NOTES
HISTORY OF PRESENT ILLNESS 
 
Sinus drainge X 3 months with coughing spells.  Dry.  
It is the asthma  only taking coughing syrup .  Not taking allergy meds or inhaler.  Will throw up. Has had No bronchitis x 1 per year  
 
Depression/ADELSO:  Been on meds in the past.  We restrated prozac 10  Mg 3 moonth ago but she felt it helped and took the edge ioff and then seems like it stops working.  
 
Says she  Is more reclusive -  was taking classes, bible study 
Recent issues with staying home a lot bc of covid.  Feels low and misses being independent and being out of the house, seeing friends and relatives. 
 is battling cancer.  - prostate .     
 
Hypertension: 
Cardiology  -  Dr Karson Redmond. Virtual was 12/2020.  
 recent defib 4/2020.  He did labs.   
 
Rheumatology- was seeing dr bill.  Seeing dr hancock now.  Have rhuematoid arthritis and has orencia infusion.   
 
 
Hyperlipidemia:   
Lab Results  
Component Value Date/Time  
 Cholesterol, total 255 (H) 11/01/2011 05:50 AM  
 HDL Cholesterol 44 11/01/2011 05:50 AM  
 LDL, calculated 146 (H) 11/01/2011 05:50 AM  
 VLDL, calculated 65 11/01/2011 05:50 AM  
 Triglyceride 325 (H) 11/01/2011 05:50 AM  
 CHOL/HDL Ratio 5.8 (H) 11/01/2011 05:50 AM  
  
 
 
 Prabha Bowman is a 65 y.o. female. 
Past Medical History:  
Diagnosis Date  
• Angina pectoris with normal coronary arteriogram ([facility])   
• Anxiety   
• CAD (coronary artery disease)   
 \"small vessel\" disease by cath at [facility] Nov 2010 ([place])  
• DJD (degenerative joint disease) of hip   
 Dr De Souza - recent steroid injection  
• DM (diabetes mellitus) ([facility])   
• Hypertension   
• Hyperthyroidism   
• Nonischemic cardiomyopathy ([facility])   
• SOFÍA (obstructive sleep apnea)   
 pulm dr street  
• Sleep apnea   
 not  on machine   
• Systolic heart failure secondary to idiopathic cardiomyopathy ([facility])   
 
Social History  
 
Tobacco Use  
• Smoking status: Never Smoker  
• Smokeless tobacco: Never Used   Substance Use Topics  Alcohol use: Yes Comment: rarely  Drug use: No  
 
 
Family History Problem Relation Age of Onset  Heart Surgery Father  Heart Attack Father  Cancer Mother   
     leukemia  Breast Cancer Sister Review of Systems Constitutional: Positive for malaise/fatigue (chronic). Negative for chills, fever and weight loss. HENT: Positive for congestion and sinus pain. Negative for ear pain, nosebleeds and sore throat. Eyes: Negative for blurred vision and pain. Respiratory: Negative for cough, sputum production, shortness of breath and wheezing. Cardiovascular: Positive for leg swelling (none current). Negative for chest pain, palpitations, orthopnea, claudication and PND. Gastrointestinal: Negative for abdominal pain and nausea. Genitourinary: Negative for dysuria and frequency. Musculoskeletal: Positive for joint pain and myalgias. Negative for falls. Skin: Positive for itching. Negative for rash. Neurological: Negative for dizziness, tremors, speech change, focal weakness, loss of consciousness, weakness and headaches. Endo/Heme/Allergies: Negative for polydipsia. Psychiatric/Behavioral: Positive for depression. Negative for hallucinations, memory loss, substance abuse and suicidal ideas. The patient is nervous/anxious. The patient does not have insomnia. Visit Vitals BP (!) 96/58 (BP 1 Location: Right upper arm, BP Patient Position: Sitting, BP Cuff Size: Adult) Pulse 78 Temp 98.3 °F (36.8 °C) (Temporal) Ht 5' 5\" (1.651 m) Wt 181 lb 12.8 oz (82.5 kg) SpO2 97% BMI 30.25 kg/m² Physical Exam 
Vitals signs and nursing note reviewed. Constitutional:   
   General: She is not in acute distress. Appearance: Normal appearance. She is obese. She is not ill-appearing. HENT:  
   Head: Normocephalic and atraumatic.   
   Right Ear: Tympanic membrane and ear canal normal.  
   Left Ear: Tympanic membrane and ear canal normal.  
   Nose: Rhinorrhea present. Mouth/Throat:  
   Mouth: Mucous membranes are moist.  
   Pharynx: No oropharyngeal exudate or posterior oropharyngeal erythema. Eyes:  
   General: No scleral icterus. Right eye: No discharge. Left eye: No discharge. Extraocular Movements: Extraocular movements intact. Conjunctiva/sclera: Conjunctivae normal.  
   Pupils: Pupils are equal, round, and reactive to light. Neck: Musculoskeletal: No muscular tenderness. Cardiovascular:  
   Rate and Rhythm: Normal rate and regular rhythm. Pulses: Normal pulses. Heart sounds: Normal heart sounds. Pulmonary:  
   Effort: Pulmonary effort is normal.  
   Breath sounds: Normal breath sounds. Abdominal:  
   General: Abdomen is flat. There is no distension. Palpations: Abdomen is soft. There is no mass. Tenderness: There is no abdominal tenderness. There is no right CVA tenderness or left CVA tenderness. Musculoskeletal:  
   Right lower leg: No edema. Left lower leg: No edema. Lymphadenopathy:  
   Cervical: No cervical adenopathy. Skin: 
   General: Skin is warm and dry. Capillary Refill: Capillary refill takes less than 2 seconds. Findings: Lesion (inclusion cyst upper back, scarred down. ) present. Neurological:  
   General: No focal deficit present. Mental Status: She is alert and oriented to person, place, and time. Deep Tendon Reflexes: Reflexes normal.  
Psychiatric:     
   Mood and Affect: Mood normal.     
   Behavior: Behavior normal.     
   Thought Content: Thought content normal.     
   Judgment: Judgment normal.  
 
 
 
 
ASSESSMENT and PLAN Diagnoses and all orders for this visit: 
 
1. Moderate persistent asthma with acute exacerbation Comments: 
 mild activation. ...restart singulair and daily  flovent. will needs spirometry/ ok for prn refill  of codiene but reinded pt that real treatment  is MDI rescue Orders: - guaiFENesin-codeine (ROBITUSSIN AC) 100-10 mg/5 mL solution; Take 5 mL by mouth three (3) times daily as needed for Cough for up to 16 days. Max Daily Amount: 15 mL. 2. Depression with anxiety Comments: 
stable on prozac.    no breakthur anxiety 3. Systolic heart failure secondary to idiopathic cardiomyopathy ([address] Comments: 
overcontrolled and on bumex 2 qod, i advised 1 mg qod. bp goal  < 135/85 but > 105/50. please let cardiology know. 4. Osteoarthritis of hip, unspecified laterality, unspecified osteoarthritis type Other orders -     FLUoxetine (PROzac) 20 mg capsule; Take 1 Cap by mouth daily. Indications: anxiousness associated with depression 
-     fluticasone propionate (FLOVENT HFA) 220 mcg/actuation inhaler; Take 1 Puff by inhalation two (2) times a day. Indications: controller medication for asthma 
-     montelukast (SINGULAIR) 10 mg tablet; Take 1 Tab by mouth daily. Indications: controller medication for asthma Orders Placed This Encounter  DISCONTD: guaiFENesin-codeine (ROBITUSSIN AC) 100-10 mg/5 mL solution  FLUoxetine (PROzac) 20 mg capsule  guaiFENesin-codeine (ROBITUSSIN AC) 100-10 mg/5 mL solution  fluticasone propionate (FLOVENT HFA) 220 mcg/actuation inhaler  montelukast (SINGULAIR) 10 mg tablet Follow-up and Dispositions · Return in about 4 months (around 6/11/2021) for Chronic office visit.

## 2021-03-15 PROBLEM — J45.41 MODERATE PERSISTENT ASTHMA WITH ACUTE EXACERBATION: Chronic | Status: ACTIVE | Noted: 2021-01-01

## 2021-04-13 NOTE — PROGRESS NOTES
History and Physical 
 
Patient: Ruiz Garcia MRN: [id number]  SSN: [id number] YOB: 1955  Age: 63 y.o. Sex: female Subjective:  
  
Ruiz Garcia is a 63 y.o. female past medical history of hypertension, hyperlipidemia is here for follow-up of type 2 diabetes mellitus. She is in primary care of Dr. Janet Herron. Patient was previously seeing endocrinologist Dr. Yariel Encinas, who retired. So patient is here to establish care with me. She was diagnosed with diabetes 16 years back. She has been using insulin pump since past 5 years or so. Current pump was started in September 2020. Her pump is RightsFlowtronic 670 G but she does not wear the continuous glucose monitor that comes with it because it was giving her soreness and also it was alarming too much for calibration. She also takes Ozempic 0.5 mg weekly and she tolerates it well. She uses a chart for carb counting. Compliant with medications and she boluses for all carbohydrate intake. At the last visit we noticed that she was having low blood glucose whenever she would skip a meal, so decrease basal rate from 1.2 units/h to 1 unit/h. However, she continues to have low blood glucose when she skips a meal and first thing in the morning when she wakes up. Glucometer reading: Patient is checking blood glucose multiple times per day. Readings are ranging from 54 to 148 mg/dL Updated diabetes history: · Diagnosis: 16 years · Current treatment: insulin pump (since 4-5 years, current pump 9-2020) and Ozempic 0.5 mg weekly (2-2020) · Past treatment: victoza (switched to Tera Stanley) · Glucose checks: 3-4 times a day · Hyperglycemia: no 
· Hypoglycemia: sometimes if she skips a meal 
· Meals per day: 3, counting carbs, refers to a chart · Exercise: no 
· DM related hospitalizations: no 
 
Complications of DM: 
· CAD: yes small artery disease, cardiomyopathy s/p defibrillator Dr. Amy Armstrong · CVA: no 
· PVD: no 
· Amputations: no  
· Retinopathy: no; last exam was , next apt in 1-2021 · Gastropathy: no 
· Nephropathy: no 
· Neuropathy: no 
 
Medications: 
· Statin: Crestor 40 mg 
· ACE-I: valsartan (Enteresto) · ASA: yes · Diabetes education: yes Pump Pump : [id number] 512 G Insulin type Humalog Insulin Time: 4 hours Insulin Pump Settings Basal rate: 
12 AM12 PM: 1 units/h Carb ratio: 1: 8 Sensitivity factor: 30 BG Targets: 061040 Past Medical History:  
Diagnosis Date  Angina pectoris with normal coronary arteriogram ([facility] [address]  Anxiety  CAD (coronary artery disease) \"small vessel\" disease by cath at [address] Nov 2010 Rosario Mccormick)  DJD (degenerative joint disease) of hip Dr Naomi Ceja - recent steroid injection  DM (diabetes mellitus) ([facility] [address]  Hypertension  Hyperthyroidism  Nonischemic cardiomyopathy ([facility] [address]  SOFÍA (obstructive sleep apnea)   
 pulm dr Rivera Pals  Sleep apnea   
 not  on machine  Systolic heart failure secondary to idiopathic cardiomyopathy ([facility] [address] Past Surgical History:  
Procedure Laterality Date  CARDIAC CATHETERIZATION  12/9/10 LVEDP 21 EF 55%, no epicardial disease. small vessel disease  CARDIAC CATHETERIZATION  10/31/11 LVEDP 23, mildly dilated LV with LVH, EF 35%, global HK, normal cors.  ECHO 2D ADULT  4/7/05  
 mild LVH. EF 55%  ECHO 2D ADULT  1/27/12 EF 35-40%, septal dysynchrony  HX COLONOSCOPY  2014  
  [facility]  
 HX GYN Hysterectomy  HX HIP REPLACEMENT    
 left  HX IMPLANTABLE CARDIOVERTER DEFIBRILLATOR  STRESS TEST LEXISCAN  12/2/10  
 no ischemia. EF 56% Family History Problem Relation Age of Onset  Heart Surgery Father  Heart Attack Father  Cancer Mother   
     leukemia  Breast Cancer Sister Social History Tobacco Use  Smoking status: Never Smoker  Smokeless tobacco: Never Used Substance Use Topics  Alcohol use: Yes Comment: rarely Prior to Admission medications Medication Sig Start Date End Date Taking? Authorizing Provider  
torsemide (DEMADEX) 20 mg tablet  4/5/21  Yes Provider, Historical  
brimonidine (ALPHAGAN) 0.2 % ophthalmic solution  3/11/21  Yes Provider, Historical  
ezetimibe (ZETIA) 10 mg tablet Take 1 Tab by mouth daily for 30 days. 4/13/21 5/13/21 Yes Mary Ann Hartman MD  
insulin aspart U-100 (NOVOLOG) 100 unit/mL injection Use as directed in insulin pump, max daily dose 70 units 4/13/21  Yes Mary Ann Hartman MD  
semaglutide (Ozempic) 0.25 mg/0.2 mL (2 mg/1.5 mL) sub-q pen 0.5 mg by SubCUTAneous route every seven (7) days for 28 days. 4/13/21 5/11/21 Yes Mary Ann Hartman MD  
FLUoxetine (PROzac) 20 mg capsule Take 1 Cap by mouth daily. Indications: anxiousness associated with depression 2/11/21  Yes Spike Chaidez MD  
fluticasone propionate (FLOVENT HFA) 220 mcg/actuation inhaler Take 1 Puff by inhalation two (2) times a day. Indications: controller medication for asthma 2/11/21  Yes Spike Chaidez MD  
montelukast (SINGULAIR) 10 mg tablet Take 1 Tab by mouth daily. Indications: controller medication for asthma 2/11/21  Yes Spike Chaidez MD  
bumetanide (BUMEX) 2 mg tablet Take 2 mg by mouth daily. Yes Provider, Historical  
gabapentin (NEURONTIN) 400 mg capsule  10/13/20  Yes Provider, Historical  
potassium chloride (KLOR-CON) 20 mEq pack Take 20 mEq by mouth two (2) times a day. Yes Provider, Historical  
isosorbide-hydrALAZINE (BiDiL) 20-37.5 mg per tablet Take 1 Tab by mouth. Yes Provider, Historical  
rosuvastatin (Crestor) 40 mg tablet Take 40 mg by mouth nightly. Yes Provider, Historical  
carvedilol (Coreg CR) 80 mg CR capsule Take  by mouth daily (with breakfast). Yes Provider, Historical  
sacubitril/valsartan (ENTRESTO PO) Take  by mouth. Yes Provider, Historical  
ergocalciferol (ERGOCALCIFEROL) 50,000 unit capsule Take 50,000 Units by mouth every Tuesday.    Yes Provider, Historical  
 insulin pump (PATIENT SUPPLIED) misc by SubCUTAneous route as needed. Yes Provider, Historical  
albuterol (PROAIR HFA) 90 mcg/actuation inhaler Take 2 Puffs by inhalation every six (6) hours as needed for Wheezing. Yes Provider, Historical  
metOLazone (ZAROXOLYN) 5 mg tablet Take 5 mg by mouth daily as needed (for weight increase of 2 lbs in 24 hrs for Heart Failure). Yes Provider, Historical  
ranolazine ER (RANEXA) 500 mg SR tablet Take 1 Tab by mouth two (2) times a day. 11/8/11  Yes CONNER Dangelo  
aspirin 81 mg chewable tablet Take 1 Tab by mouth daily. 11/1/11  Yes CONNER Dangelo  
nitroglycerin (NITROSTAT) 0.4 mg SL tablet 1 Tab by SubLINGual route every five (5) minutes as needed for Chest Pain. 11/1/11  Yes CONNER Dangelo  
omega-3 fatty acids-vitamin e (FISH OIL) 1,000 mg Cap Take 2 Caps by mouth two (2) times a week. 11/1/11  Yes CONNER Dangelo Allergies Allergen Reactions  Pcn [Penicillins] Swelling  Sulfa (Sulfonamide Antibiotics) Itching Review of Systems: ROS A comprehensive review of systems was preformed and it is negative except mentioned in HPI Objective:  
 
Vitals:  
 04/13/21 1413 BP: 112/68 Pulse: 79 Temp: 98 °F (36.7 °C) TempSrc: Temporal  
SpO2: 99% Weight: 184 lb 1.6 oz (83.5 kg) Height: 5' 5\" (1.651 m) Physical Exam: 
 
Physical Exam 
Vitals signs and nursing note reviewed. Constitutional:   
   Appearance: Normal appearance. HENT:  
   Head: Normocephalic and atraumatic. Neck: Musculoskeletal: Neck supple. Cardiovascular:  
   Rate and Rhythm: Normal rate and regular rhythm. Pulmonary:  
   Effort: Pulmonary effort is normal.  
   Breath sounds: Normal breath sounds. Neurological:  
   Mental Status: She is alert. 12- 
diabetic foot exam: 
Bilateral diabetic foot exam was performed today. Dorsalis pedis pulses 2+ bilaterally.  
Monofilament sensation normal bilaterally. No ulcers or skin breakdown. Bilateral great toe onychomycosis present Labs and Imaging: 
Results for Nicolas Bach (MRN [id number]) as of 4/13/2021 13:56 Ref. Range 4/7/2021 00:00 Triglyceride Latest Ref Range: 0 - 149 mg/dL 129 Cholesterol, total Latest Ref Range: 100 - 199 mg/dL 248 (H) HDL Cholesterol Latest Ref Range: >39 mg/dL 49 VLDL, calculated Latest Ref Range: 5 - 40 mg/dL 23 LDL, calculated Latest Ref Range: 0 - 99 mg/dL 176 (H) Creatinine, urine Latest Ref Range: Not Estab. mg/dL 242.2 Microalbumin, urine Latest Ref Range: Not Estab. ug/mL 218.2 Microalbumin/Creat. Ratio Latest Ref Range: 0 - 29 mg/g creat 90 (H) TSH Latest Ref Range: 0.450 - 4.500 uIU/mL 1.160 Last 3 Recorded Weights in this Encounter 04/13/21 1413 Weight: 184 lb 1.6 oz (83.5 kg) Lab Results Component Value Date/Time Hemoglobin A1c 7.9 (H) 01/25/2017 10:18 AM  
  
 
Assessment:  
 
Patient Active Problem List  
Diagnosis Code  CAD (coronary artery disease) I25.10  
 HTN (hypertension) I10  
 Nonischemic cardiomyopathy (HCC) I42.8  Dyslipidemia E78.5  DM type 2 causing vascular disease ([facility] [address] E11.59  Sleep apnea G47.30  Hyperthyroidism E05.90  
 Osteoarthritis of hip M16.9  Chest pain, atypical R07.89  Dyspnea R06.00  Systolic heart failure secondary to idiopathic cardiomyopathy (HCC) I50.20, I42.9  Elevated LFTs R79.89  Systolic CHF, acute on chronic (HCC) I50.23  
 Localized edema R60.0  
 Inclusion cyst L72.0  Depression with anxiety F41.8  Onychomycosis B35.1  Moderate persistent asthma with acute exacerbation J45.41 Plan:  
 
Type 2 diabetes mellitus, well controlled: 
Hemoglobin A1c is 6.4% on 12-, 6.1% today. Fingerstick blood glucose is 198 mg/dL in my office today. Up to date with diabetes related annual labs: yes Up to date with diabetic eye exam: January 2021 Plan: 
Given patient's comorbidities, avoiding hypoglycemia is important. Decrease basal rate from 1 units/h to 0.8 unit/h. I will continue current carb ratio, sensitivity factor. I will see her back in 3 months. Essential hypertension: 
Blood pressure well controlled on current medications. Patient has microalbuminuria checked on 2021. Mixed hyperlipidemia: 
Currently on Crestor 40 mg daily at bedtime. 2021: Total cholesterol 248, triglycerides 129, . Patient is reporting compliance with Crestor 40 mg. Add ezetimibe 10 mg daily. Check lipid profile prior to next visit in 3 months. Coronary artery disease, cardiomyopathy status post defibrillator Follows with cardiologist Dr. Braden Shown. Orders Placed This Encounter  LIPID PANEL Standing Status:   Future Standing Expiration Date:   10/13/2021  AMB POC HEMOGLOBIN A1C  
 AMB POC GLUCOSE BLOOD, BY GLUCOSE MONITORING DEVICE  
 ezetimibe (ZETIA) 10 mg tablet Sig: Take 1 Tab by mouth daily for 30 days. Dispense:  30 Tab Refill:  3  
 insulin aspart U-100 (NOVOLOG) 100 unit/mL injection Sig: Use as directed in insulin pump, max daily dose 70 units Dispense:  3 Vial  
  Refill:  3  
 semaglutide (Ozempic) 0.25 mg/0.2 mL (2 mg/1.5 mL) sub-q pen Si.5 mg by SubCUTAneous route every seven (7) days for 28 days. Dispense:  1 Box Refill:  3 Signed By: Angela Stafford MD   
 2021 Return to clinic 3 months

## 2021-04-13 NOTE — LETTER
4/13/2021 Patient: Jessica Beyer YOB: 1955 Date of Visit: 4/13/2021 Mary Jo Villarreal MD 
[place] [id number] [place] [phone] [address] [employer] Dear Mary Jo Villarreal MD, Thank you for referring Ms. Camden Momin to [address] [place] [address] for evaluation. My notes for this consultation are attached. If you have questions, please do not hesitate to call me. I look forward to following your patient along with you. Sincerely, Ashli Martinez MD

## 2021-07-13 DIAGNOSIS — E78.2 MIXED HYPERLIPIDEMIA: ICD-10-CM
